# Patient Record
Sex: FEMALE | Race: WHITE | NOT HISPANIC OR LATINO | Employment: OTHER | URBAN - METROPOLITAN AREA
[De-identification: names, ages, dates, MRNs, and addresses within clinical notes are randomized per-mention and may not be internally consistent; named-entity substitution may affect disease eponyms.]

---

## 2021-10-30 PROCEDURE — 81001 URINALYSIS AUTO W/SCOPE: CPT | Performed by: STUDENT IN AN ORGANIZED HEALTH CARE EDUCATION/TRAINING PROGRAM

## 2021-10-30 PROCEDURE — 99285 EMERGENCY DEPT VISIT HI MDM: CPT | Mod: 25

## 2021-10-30 PROCEDURE — 81001 URINALYSIS AUTO W/SCOPE: CPT | Performed by: EMERGENCY MEDICINE

## 2021-10-31 ENCOUNTER — HOSPITAL ENCOUNTER (EMERGENCY)
Facility: HOSPITAL | Age: 71
Discharge: HOME | End: 2021-10-31
Attending: EMERGENCY MEDICINE | Admitting: EMERGENCY MEDICINE
Payer: MEDICARE

## 2021-10-31 ENCOUNTER — APPOINTMENT (OUTPATIENT)
Dept: CT IMAGING | Facility: HOSPITAL | Age: 71
End: 2021-10-31
Attending: EMERGENCY MEDICINE
Payer: MEDICARE

## 2021-10-31 VITALS
RESPIRATION RATE: 18 BRPM | OXYGEN SATURATION: 96 % | WEIGHT: 140 LBS | DIASTOLIC BLOOD PRESSURE: 63 MMHG | SYSTOLIC BLOOD PRESSURE: 135 MMHG | HEART RATE: 64 BPM | TEMPERATURE: 97 F

## 2021-10-31 DIAGNOSIS — N20.1 URETEROLITHIASIS: ICD-10-CM

## 2021-10-31 LAB
ALBUMIN UR-MCNC: NEGATIVE MG/DL
ANION GAP SERPL CALCULATED.3IONS-SCNC: 7 MMOL/L (ref 5–18)
APPEARANCE UR: CLEAR
BILIRUB UR QL STRIP: NEGATIVE
BUN SERPL-MCNC: 15 MG/DL (ref 8–28)
C REACTIVE PROTEIN LHE: <0.1 MG/DL (ref 0–0.8)
CALCIUM SERPL-MCNC: 10 MG/DL (ref 8.5–10.5)
CHLORIDE BLD-SCNC: 109 MMOL/L (ref 98–107)
CLUE CELLS: ABNORMAL
CO2 SERPL-SCNC: 25 MMOL/L (ref 22–31)
COLOR UR AUTO: COLORLESS
CREAT SERPL-MCNC: 0.83 MG/DL (ref 0.6–1.1)
ERYTHROCYTE [DISTWIDTH] IN BLOOD BY AUTOMATED COUNT: 13.5 % (ref 10–15)
GFR SERPL CREATININE-BSD FRML MDRD: 71 ML/MIN/1.73M2
GLUCOSE BLD-MCNC: 108 MG/DL (ref 70–125)
GLUCOSE UR STRIP-MCNC: NEGATIVE MG/DL
HCT VFR BLD AUTO: 36.8 % (ref 35–47)
HGB BLD-MCNC: 12.2 G/DL (ref 11.7–15.7)
HGB UR QL STRIP: ABNORMAL
KETONES UR STRIP-MCNC: NEGATIVE MG/DL
LEUKOCYTE ESTERASE UR QL STRIP: NEGATIVE
MCH RBC QN AUTO: 30.8 PG (ref 26.5–33)
MCHC RBC AUTO-ENTMCNC: 33.2 G/DL (ref 31.5–36.5)
MCV RBC AUTO: 93 FL (ref 78–100)
MUCOUS THREADS #/AREA URNS LPF: PRESENT /LPF
NITRATE UR QL: NEGATIVE
PH UR STRIP: 7 [PH] (ref 5–7)
PLATELET # BLD AUTO: 436 10E3/UL (ref 150–450)
POTASSIUM BLD-SCNC: 3.9 MMOL/L (ref 3.5–5)
RBC # BLD AUTO: 3.96 10E6/UL (ref 3.8–5.2)
RBC URINE: 10 /HPF
SODIUM SERPL-SCNC: 141 MMOL/L (ref 136–145)
SP GR UR STRIP: 1.01 (ref 1–1.03)
SQUAMOUS EPITHELIAL: <1 /HPF
TRICHOMONAS, WET PREP: ABNORMAL
UROBILINOGEN UR STRIP-MCNC: <2 MG/DL
WBC # BLD AUTO: 9.7 10E3/UL (ref 4–11)
WBC URINE: <1 /HPF
WBC'S/HIGH POWER FIELD, WET PREP: ABNORMAL
YEAST, WET PREP: ABNORMAL

## 2021-10-31 PROCEDURE — 250N000011 HC RX IP 250 OP 636: Performed by: EMERGENCY MEDICINE

## 2021-10-31 PROCEDURE — 74174 CTA ABD&PLVS W/CONTRAST: CPT

## 2021-10-31 PROCEDURE — 250N000013 HC RX MED GY IP 250 OP 250 PS 637: Performed by: EMERGENCY MEDICINE

## 2021-10-31 PROCEDURE — 86141 C-REACTIVE PROTEIN HS: CPT | Performed by: EMERGENCY MEDICINE

## 2021-10-31 PROCEDURE — 85027 COMPLETE CBC AUTOMATED: CPT | Performed by: EMERGENCY MEDICINE

## 2021-10-31 PROCEDURE — 36415 COLL VENOUS BLD VENIPUNCTURE: CPT | Performed by: EMERGENCY MEDICINE

## 2021-10-31 PROCEDURE — 80048 BASIC METABOLIC PNL TOTAL CA: CPT | Performed by: EMERGENCY MEDICINE

## 2021-10-31 PROCEDURE — 87210 SMEAR WET MOUNT SALINE/INK: CPT | Performed by: EMERGENCY MEDICINE

## 2021-10-31 RX ORDER — OXYCODONE HYDROCHLORIDE 5 MG/1
5 TABLET ORAL ONCE
Status: COMPLETED | OUTPATIENT
Start: 2021-10-31 | End: 2021-10-31

## 2021-10-31 RX ORDER — OXYCODONE HYDROCHLORIDE 5 MG/1
5 TABLET ORAL EVERY 4 HOURS PRN
Qty: 8 TABLET | Refills: 0 | Status: SHIPPED | OUTPATIENT
Start: 2021-10-31 | End: 2021-11-04

## 2021-10-31 RX ORDER — DIMENHYDRINATE 50 MG
50 TABLET ORAL EVERY 6 HOURS PRN
Qty: 28 TABLET | Refills: 0 | Status: SHIPPED | OUTPATIENT
Start: 2021-10-31 | End: 2021-11-07

## 2021-10-31 RX ORDER — MORPHINE SULFATE 4 MG/ML
4 INJECTION, SOLUTION INTRAMUSCULAR; INTRAVENOUS ONCE
Status: DISCONTINUED | OUTPATIENT
Start: 2021-10-31 | End: 2021-10-31 | Stop reason: HOSPADM

## 2021-10-31 RX ORDER — IOPAMIDOL 755 MG/ML
100 INJECTION, SOLUTION INTRAVASCULAR ONCE
Status: COMPLETED | OUTPATIENT
Start: 2021-10-31 | End: 2021-10-31

## 2021-10-31 RX ADMIN — OXYCODONE HYDROCHLORIDE 5 MG: 5 TABLET ORAL at 03:15

## 2021-10-31 RX ADMIN — IOPAMIDOL 100 ML: 755 INJECTION, SOLUTION INTRAVENOUS at 02:24

## 2021-10-31 NOTE — DISCHARGE INSTRUCTIONS
Your symptoms today are likely due to a kidney stone that was found in the left kidney system.  Use the provided medications for any discomfort but use ibuprofen and Tylenol primarily.  Follow-up with kidney stone specialists within the week.    Return to the ED if symptoms worsen or new arise

## 2021-10-31 NOTE — ED TRIAGE NOTES
Pt reports persistent vaginal pressure and feeling like she needs to urinate almost constantly over the past month that began shortly after she and her spouse had sex. Pt reports she has had 3 urinalyses, the most recent being 2 days ago, since the onset of symptoms. Pt reports all 3 were negative for bacteria although the initial one contained blood. Denies fevers, denies SOB, Denies CP, Denies cough, denies NVD, Denies blood in stool. No other complaints.

## 2021-10-31 NOTE — ED PROVIDER NOTES
EMERGENCY DEPARTMENT ENCOUNTER      FINAL IMPRESSION    1. Ureterolithiasis        MEDICAL DECISION MAKING    71-year-old female with minimal medical history presented to the ED with low pelvic abdominal discomfort with sensation of inability to fully void. Vitals on arrival significant hypertension 218/95. Examination with suprapubic discomfort.  Etiology concerning for an occult UTI versus stone versus occult aortic process or diverticular appendiceal process.  Given the significant hypertension some occult concern for aortic process though patient appears quite comfortable.     IV established lab work is sent is noted grossly reassuring.  UA without infectious evidence. Wet prep negative. CRP negative.     CTA abdomen pelvis shows ureterolithiasis with left stone distally and mild hydronephrosis. .     Patient given IV fluids analgesics and antiemetics with good results.     Ultimately with a grossly reassuring work-up at this time I believe she is safe for discharge.    Due to contagious pathogen concern full protective equipment was utilized through the duration of the interaction including N95  mask, eye shield, hair cover, gown and gloves.     MEDICATIONS GIVEN IN THE EMERGENCY:  Medications   morphine (PF) injection 4 mg (4 mg Intravenous Not Given 10/31/21 0117)   iopamidol (ISOVUE-370) solution 100 mL (100 mLs Intravenous Given 10/31/21 0224)     NEW PRESCRIPTIONS STARTED AT TODAY'S ER VISIT     Review of your medicines      START taking      Dose / Directions   dimenhyDRINATE 50 MG tablet  Commonly known as: DRAMAMINE      Dose: 50 mg  Take 1 tablet (50 mg) by mouth every 6 hours as needed for other (kidney stone pain management)  Quantity: 28 tablet  Refills: 0     oxyCODONE 5 MG tablet  Commonly known as: ROXICODONE      Dose: 5 mg  Take 1 tablet (5 mg) by mouth every 4 hours as needed for severe pain If pain is not improved with acetaminophen and ibuprofen.  Quantity: 8 tablet  Refills: 0            Where to get your medicines      Some of these will need a paper prescription and others can be bought over the counter. Ask your nurse if you have questions.    Bring a paper prescription for each of these medications    dimenhyDRINATE 50 MG tablet    oxyCODONE 5 MG tablet       CHIEF COMPLAINT    Chief Complaint   Patient presents with     urinary symptoms     vaginal pain       HPI    Rossana Rice is a 71 year old female with no pertinent past medical history who presents to this Emergency Department for evaluation of urinary symptoms.      Over the past 1 month, patient has had multiple episodes of suprapubic discomfort, urgency, and feels as if she is not fully emptying her bladder. The suprapubic discomfort is described as an achy pressure. She assumed that symptoms were related to a UTI, although has had 3 urine tests and all returned negative, although the first one contained blood. Has a history of previous hysterectomy but no other abdominal surgeries. Otherwise denies fever, nausea, vomiting, diarrhea, constipation, vaginal bleeding or discharge, chest pain, shortness of breath, or any other complaints at this time. No history of kidney stones.     This document serves as a record of services performed by Dr. Cristhian Mathew. It was created on his behalf by Aundrea Santos, trained medical scribe. The creation of this record is based on the scribes personal observations and the providers statements to him/her. This document has been checked and approved by the attending provider.    RELEVANT HISTORY, MEDICATIONS, & ALLERGIES   Past medical history, surgical history, family history, medications, and allergies reviewed and pertinent noted in HPI. See end of note for comprehensive list.    REVIEW OF SYSTEMS    Constitutional:  No fever or chills, no weakness  Respiratory: No shortness of breath, no cough  Cardiovascular:  No chest pain, no palpitations, no syncope.  GI:  No nausea, no vomiting, no diarrhea.  Reports suprapubic discomfort.  : No dysuria, No hematuria, No frequency. Reports urgency and sensation of inability to fully void.   Musculoskeletal:  No new muscle/joint pain, no swelling, no loss of function.   Neurologic:  No headache, no focal weakness , no sensory changes    All other systems reviewed and are negative.    PHYSICAL EXAM    VITALS SIGNS: BP (!) 179/77   Pulse 68   Temp 97  F (36.1  C) (Temporal)   Resp 18   Wt 63.5 kg (140 lb)   SpO2 98%   Constitutional:  Awake, Alert, Cooperative.  HENT: Normocephalic, Atraumatic, Bilateral external ears normal, Oropharynx moist, Nose normal.   Neck: Normal range of motion, No tenderness, Supple, No meningismus, No stridor.   Eyes: PERRL, EOMI, Conjunctiva normal, No discharge.   Respiratory: Normal breath sounds, No respiratory distress, No wheezing, No chest tenderness.   Cardiovascular: Normal heart rate, Normal rhythm, No murmurs, No rubs, No gallops.   GI: Soft, mild suprapubic tenderness, No guarding, No CVA tenderness.   : Performed with female tech chaperone present - without discomfort nor discharge.  Musculoskeletal: Neurovascularly intact distally, No edema, No tenderness  Integument: Warm, Dry, No erythema, No rash.   Neurologic: Alert & oriented x 3, Normal motor function, Normal sensory function, No focal deficits noted.     LABS  Labs Ordered and Resulted from Time of ED Arrival to Time of ED Departure   ROUTINE UA WITH MICROSCOPIC REFLEX TO CULTURE - Abnormal       Result Value    Color Urine Colorless      Appearance Urine Clear      Glucose Urine Negative      Bilirubin Urine Negative      Ketones Urine Negative      Specific Gravity Urine 1.007      Blood Urine 0.03 mg/dL (*)     pH Urine 7.0      Protein Albumin Urine Negative      Urobilinogen Urine <2.0      Nitrite Urine Negative      Leukocyte Esterase Urine Negative      Mucus Urine Present (*)     RBC Urine 10 (*)     WBC Urine <1      Squamous Epithelials Urine <1     BASIC  METABOLIC PANEL - Abnormal    Sodium 141      Potassium 3.9      Chloride 109 (*)     Carbon Dioxide (CO2) 25      Anion Gap 7      Urea Nitrogen 15      Creatinine 0.83      Calcium 10.0      Glucose 108      GFR Estimate 71     WET PREPARATION - Abnormal    Trichomonas Absent      Yeast Absent      Clue Cells Absent      WBCs/high power field 2+ (*)    CRP INFLAMMATION - Normal    CRP <0.1     CBC WITH PLATELETS         EKG    None    RADIOLOGY    Please see official radiology report.  CTA Abdomen Pelvis with Contrast   Final Result   IMPRESSION:   1.  4 mm calculus distal left ureter. Mild left hydronephrosis.            All laboratories, imaging and EKG's were personally reviewed and interpreted by myself prior to disposition decision.     PROCEDURES    NONE    Comprehensive outline of EPIC chart Hx  PAST MEDICAL HISTORY    History reviewed. No pertinent past medical history.  History reviewed. No pertinent surgical history.    CURRENT MEDICATIONS      Current Facility-Administered Medications:      morphine (PF) injection 4 mg, 4 mg, Intravenous, Once, Cristhian Mathew MD    Current Outpatient Medications:      dimenhyDRINATE (DRAMAMINE) 50 MG tablet, Take 1 tablet (50 mg) by mouth every 6 hours as needed for other (kidney stone pain management), Disp: 28 tablet, Rfl: 0     oxyCODONE (ROXICODONE) 5 MG tablet, Take 1 tablet (5 mg) by mouth every 4 hours as needed for severe pain If pain is not improved with acetaminophen and ibuprofen., Disp: 8 tablet, Rfl: 0    ALLERGIES    Allergies   Allergen Reactions     Penicillin G Anaphylaxis     Ciprofloxacin Other (See Comments)     tendonitis     Sulfa Drugs Nausea and Vomiting       FAMILY HISTORY    History reviewed. No pertinent family history.    SOCIAL HISTORY    Social History     Socioeconomic History     Marital status:      Spouse name: Not on file     Number of children: Not on file     Years of education: Not on file     Highest education level: Not  on file   Occupational History     Not on file   Tobacco Use     Smoking status: Not on file   Substance and Sexual Activity     Alcohol use: Not on file     Drug use: Not on file     Sexual activity: Not on file   Other Topics Concern     Not on file   Social History Narrative     Not on file     Social Determinants of Health     Financial Resource Strain:      Difficulty of Paying Living Expenses:    Food Insecurity:      Worried About Running Out of Food in the Last Year:      Ran Out of Food in the Last Year:    Transportation Needs:      Lack of Transportation (Medical):      Lack of Transportation (Non-Medical):    Physical Activity:      Days of Exercise per Week:      Minutes of Exercise per Session:    Stress:      Feeling of Stress :    Social Connections:      Frequency of Communication with Friends and Family:      Frequency of Social Gatherings with Friends and Family:      Attends [employer] Services:      Active Member of Clubs or Organizations:      Attends Club or Organization Meetings:      Marital Status:    Intimate Partner Violence:      Fear of Current or Ex-Partner:      Emotionally Abused:      Physically Abused:      Sexually Abused:        This document serves as a record of services performed by Dr. Cristhian Mathew. It was created on his behalf by Aundrea Santos, trained medical scribe. The creation of this record is based on the scribes personal observations and the providers statements to him/her.     I Dr. Cristhian Mathew, personally performed the services described in this documentation as scribed by the above named individual in my presence, and it is both accurate and complete.      Cristhian Mathew MD  10/31/21 0257

## 2021-11-01 ENCOUNTER — TELEPHONE (OUTPATIENT)
Dept: UROLOGY | Facility: CLINIC | Age: 71
End: 2021-11-01

## 2021-11-01 NOTE — TELEPHONE ENCOUNTER
Message left for patient to call clinic to set up an appointment for kidney stone follow-up.  Kisha Myers RN

## 2021-11-02 ENCOUNTER — VIRTUAL VISIT (OUTPATIENT)
Dept: UROLOGY | Facility: CLINIC | Age: 71
End: 2021-11-02
Payer: MEDICARE

## 2021-11-02 DIAGNOSIS — N20.1 CALCULUS OF URETER: Primary | ICD-10-CM

## 2021-11-02 DIAGNOSIS — N13.2 HYDRONEPHROSIS WITH URINARY OBSTRUCTION DUE TO URETERAL CALCULUS: ICD-10-CM

## 2021-11-02 DIAGNOSIS — N20.1 URETEROLITHIASIS: ICD-10-CM

## 2021-11-02 DIAGNOSIS — R39.15 URINARY URGENCY: ICD-10-CM

## 2021-11-02 PROCEDURE — 99203 OFFICE O/P NEW LOW 30 MIN: CPT | Mod: 95 | Performed by: PHYSICIAN ASSISTANT

## 2021-11-02 RX ORDER — LOSARTAN POTASSIUM 50 MG/1
50 TABLET ORAL
COMMUNITY
Start: 2021-10-05

## 2021-11-02 RX ORDER — MULTIPLE VITAMINS W/ MINERALS TAB 9MG-400MCG
1 TAB ORAL DAILY
COMMUNITY

## 2021-11-02 RX ORDER — AMOXICILLIN 500 MG
2 CAPSULE ORAL DAILY
COMMUNITY

## 2021-11-02 RX ORDER — NICOTINE 14MG/24HR
PATCH, TRANSDERMAL 24 HOURS TRANSDERMAL
COMMUNITY

## 2021-11-02 ASSESSMENT — PAIN SCALES - GENERAL: PAINLEVEL: MODERATE PAIN (4)

## 2021-11-02 NOTE — PROGRESS NOTES
Assessment/Plan:    Assessment & Plan   Rossana was seen today for new patient.    Diagnoses and all orders for this visit:    Calculus of ureter  -     CT Abdomen Pelvis w/o Contrast - LOW DOSE; Future  -     Patient Stated Goal: Pass my stone    Hydronephrosis with urinary obstruction due to ureteral calculus    Urinary urgency    Stone Management Plan  Stone Management 11/2/2021   Urinary Tract Infection No suspicion of infection   Renal Colic Well controlled symptoms   Renal Failure No suspicion of renal failure   Current CT date 10/31/2021   Right sided stones? No   R Stone Event No current event   Left sided stones? Yes   L Number of ureteral stones 1   L GSD of ureteral stones 7   L Location of ureteral stone Distal   L Number of kidney stones  No renal stones   L Hydronephrosis Mild   L Stone Event New event   Diagnosis date 10/31/2021   Initial location of primary symptomatic stone Distal   Initial GSD of primary symptomatic stone 7   L MET Status Initiation   L Current Plan MET   MET 2 week F/U         PLAN    72 yo F first time stone former with recent ER visit for obstructing, left distal ureteral stone, symptoms currently controlled.    Will proceed with medical expulsive therapy. Risks and benefits were detailed of medical expulsive therapy including probability of stone passage, recurrent renal colic, and requirement of emergency medical and/or surgical care and further imaging. Patient verbalized understanding. Patient agrees with plan as discussed. She is scheduled to fly to [place], leaving Thursday for the weekend. If she pursues trip, recommend she travel with medications, strainer and copy of recent CT scan. She will return in 2 weeks with low dose CT scan.    For symptom control, she was prescribed oxycodone from ER. Over the counter symptom control medications of ibuprofen, Dramamine and Tylenol were recommended.    Video call duration: 22 minutes  30 minutes spent on the date of the encounter  doing chart review, history and exam, documentation and further activities per the note    Leslie Soler PA-C  [facility]    Subjective:     HPI  Ms. Rossana Rice is a 71 year old  female who is being evaluated via a billable video visit by [facility] [place] following [facility] ER visit for urolithiasis.    She is a first time unidentified composition stone former. She has no identified modifiable stone risk factors. She has no identified non-modifiable stone risk factors.    She was seen in ER 10/30/21 for acute onset, intermittent suprapubic pain x 1 month. She noted difficulty urinating with urinary urgency and incomplete emptying sensation. The pain was achy and pressure like. She initially attributed symptoms to UTI but had negative urinalysis results. Workup was notable for CT reporting an obstructing left ureteral stone. Labs were unremarkable. She was sent home with dramamine and oxycodone.    She is doing better today but continues to have urinary urgency. She has been taking Tylenol with good pain management, She denies symptoms of fever, chills, flank pain, nausea, vomiting, and dysuria.     CT scan from 10/31/21 is personally reviewed and demonstrates a mildly obstructing 7 x 4 mm left distal ureteral stone.    Significant labs from presentation include mild hematuria, no pyuria, negative nitrite, no bacteria, normal WBC, normal C reactive protein, normal creatinine and normal potassium.    ROS   A 12 point comprehensive review of systems is negative except for HPI    Past Medical History:   Diagnosis Date     Esophageal reflux      HTN (hypertension)      Hypercholesterolemia      Osteoporosis        No past surgical history on file.    Current Outpatient Medications   Medication Sig Dispense Refill     Calcium Carb-Cholecalciferol 600-100 MG-UNIT CAPS        dimenhyDRINATE (DRAMAMINE) 50 MG tablet Take 1 tablet (50 mg) by mouth every 6  hours as needed for other (kidney stone pain management) 28 tablet 0     losartan (COZAAR) 50 MG tablet Take 50 mg by mouth       multivitamin w/minerals (MULTI-VITAMIN) tablet Take 1 tablet by mouth daily       Omega-3 Fatty Acids (FISH OIL) 1200 MG capsule Take 2 g by mouth daily        Saccharomyces boulardii (PROBIOTIC) 250 MG CAPS        vitamin B complex with vitamin C (VITAMIN  B COMPLEX) tablet Take 1 tablet by mouth daily       vitamin E (TOCOPHEROL) 200 units (90 mg) capsule Take 200 Units by mouth daily       oxyCODONE (ROXICODONE) 5 MG tablet Take 1 tablet (5 mg) by mouth every 4 hours as needed for severe pain If pain is not improved with acetaminophen and ibuprofen. 8 tablet 0       Allergies   Allergen Reactions     Penicillin G Anaphylaxis     Ciprofloxacin Other (See Comments)     tendonitis     Sulfa Drugs Nausea and Vomiting       Social History     Socioeconomic History     Marital status:      Spouse name: Not on file     Number of children: Not on file     Years of education: Not on file     Highest education level: Not on file   Occupational History     Not on file   Tobacco Use     Smoking status: Not on file   Substance and Sexual Activity     Alcohol use: Not on file     Drug use: Not on file     Sexual activity: Not on file   Other Topics Concern     Parent/sibling w/ CABG, MI or angioplasty before 65F 55M? Not Asked   Social History Narrative     Not on file     Social Determinants of Health     Financial Resource Strain:      Difficulty of Paying Living Expenses:    Food Insecurity:      Worried About Running Out of Food in the Last Year:      Ran Out of Food in the Last Year:    Transportation Needs:      Lack of Transportation (Medical):      Lack of Transportation (Non-Medical):    Physical Activity:      Days of Exercise per Week:      Minutes of Exercise per Session:    Stress:      Feeling of Stress :    Social Connections:      Frequency of Communication with Friends and  Family:      Frequency of Social Gatherings with Friends and Family:      Attends Muslim Services:      Active Member of Clubs or Organizations:      Attends Club or Organization Meetings:      Marital Status:    Intimate Partner Violence:      Fear of Current or Ex-Partner:      Emotionally Abused:      Physically Abused:      Sexually Abused:        No family history on file.    Objective:     No vitals or physical exam obtained due to virtual visit    LABS   Most Recent 3 CBC's:  Recent Labs   Lab Test 10/31/21  0116   WBC 9.7   HGB 12.2   MCV 93        Most Recent 3 BMP's:  Recent Labs   Lab Test 10/31/21  0116      POTASSIUM 3.9   CHLORIDE 109*   CO2 25   BUN 15   CR 0.83   ANIONGAP 7   MICHELLE 10.0        Most Recent 6 Bacteria Isolates From Any Culture (See EPIC Reports for Culture Details):No lab results found.  Most Recent Urinalysis:  Recent Labs   Lab Test 10/30/21  2351   COLOR Colorless   APPEARANCE Clear   URINEGLC Negative   URINEBILI Negative   URINEKETONE Negative   SG 1.007   UBLD 0.03 mg/dL*   URINEPH 7.0   PROTEIN Negative   NITRITE Negative   LEUKEST Negative   RBCU 10*   WBCU <1     Most Recent ESR & CRP:  Recent Labs   Lab Test 10/31/21  0116   CRP <0.1

## 2021-11-02 NOTE — PATIENT INSTRUCTIONS
Patient Stated Goal: Pass my stone  Symptom Control While Passing A Stone    The goal of [facility] [place] is to let a smaller kidney stone (less than 4 to 5 mm) pass without intervention if possible. Giving your body a chance to clear the stone may take a few hours up to a few weeks.  Keeping you well-informed, safe and fairly comfortable is important.    Drink to thirst  Do not attempt to  flush out  your stone by drinking too much fluid. This does not work and may increase nausea. Drink enough to satisfy your body s thirst. Eating your normal diet is fine.   Medications (that may be suggested or prescribed)    Ibuprofen (Advil or Motrin) Available over the counter  o Take two (200mg) tablets every six hours until the stone passes.  o Prevents spasm of the ureter.    o Decreases pain.      Dramamine* (drowsy version, non-generic formulation) Available over the counter  o Take 50mg at bedtime  o Decreases spasms of the ureter  o Decreases nausea  o Decreases acute pain  o Decreases recurrence of pain for 24 hours  o Will help you sleep  *This medication will cause increase drowsiness, do not drive or operate machinery for 6 hours.      Narcotics (Percocet, Vicodin, Dilaudid) Take as prescribed for severe pain unrelieved by ibuprofen and Dramamine  o Narcotics have significant side effects and only  cover-up  pain. They have no effect on the cause of pain.  o Common side effects  - Confusion, disorientation and sedation - DO NOT DRIVE OR OPERATE MACHINERY WITHIN 24 HOURS  - Nausea - take Dramamine or Zofran or Haldol to help control  - Constipation  - Sleep disturbances      Ondansetron (Zofran) Take as prescribed  o Reserve for severe nausea  o May cause constipation, start over the counter Miralax if needed      Second Line Anti-Nausea Medication: Adding a different anti-nausea medication maybe helpful for persistent nausea.  The combined effect of different types of anti-nausea medications maybe more  effective than either medication by itself, even in higher doses.  o Compazine: Take as prescribed      Information about kidney stones    Crystals can form if chemicals are too concentrated in your urine. If the crystal grows over time, a stone may form. A stone usually isn t painful while it is still in the kidney.    As the stone begins to leave the kidney, you may experience episodes of flank pain as the kidney stone approaches the entrance to the ureter. Some people feel a vague ache in the side.    Kidney stones may fall into the ureter. Some stones are tiny and pass through without causing symptoms. The ureter is a small tube (approximately 1/8 of an inch wide). A kidney stone can get stuck and block the ureter. If this happens, urine backs up and flows back to the kidney. Back pressure on the kidney can cause:  o Severe flank pain radiating to the groin.  o Severe nausea and vomiting.  o The pain can occur in the lower back, side, groin or all three.      When the stone reaches the lower ureter, this can irritate the bladder and sensations of feeling the urge to urinate frequently and urgently may occur.      Once the stone passes out of your ureter and into your bladder, the symptoms of urgency and frequency will often disappear. Sometimes pain will come back for a short period and will not be as severe as before. The passage of the stone from your bladder and out of your body is usually not a problem. The urethra is at least twice as wide as the normal ureter, so the stone doesn t usually block it.    Strain all urine  If you pass the stone, save it. Place it in the container we have provided and bring it to the [facility] [place] within a week of passing it. Your stone will then be sent for analysis which takes about a month.     Signs and symptoms you might experience    Nausea    Decreased appetite    Urinary frequency    Bloody urine     Chills    Fatigue    When to call [facility] [place] or  go to the Emergency Room    Fever with a temperature greater than 100.1    Severe pain    Persistent nausea/vomiting    If the pain worsens or nausea/vomiting is uncontrolled with medications, STOP eating & drinking. You need to have an empty stomach for 8 hours prior to surgery. Call the [facility] immediately at [phone].           Follow-up    Low dose CT scan with doctor visit 1-2 weeks after initial clinic visit per doctor s instructions    Please cancel the CT scan visit if you pass a stone. Reschedule for a one month follow-up with doctor to discuss stone composition and future prevention.    Preventing future stones    Approximately a month after your stone is sent out for analysis, a prevention visit will occur with your provider, to discuss an individualized plan for prevention of new stones and to discuss managing stones that you may still have. Along with the analysis of the kidney stone, blood and urine tests may be indicated to develop this plan. Knowing the type of kidney stones you make, and why, allows the providers at the [facility] to recommend specific ways to prevent them.    Follow-up visits are an important part of monitoring and preventing future re-occurrences.    The [facility] is available for questions or concerns 24 hours a day at [phone]

## 2021-11-02 NOTE — PROGRESS NOTES
Patient is roomed via telephone for a virtual visit.  Patient confirmed she is in the [facility] at the time of this appointment.  Patient understands that this virtual visit is billable and agree to proceed with appointment.

## 2021-11-10 ENCOUNTER — TELEPHONE (OUTPATIENT)
Dept: UROLOGY | Facility: CLINIC | Age: 71
End: 2021-11-10
Payer: MEDICARE

## 2021-11-16 ENCOUNTER — VIRTUAL VISIT (OUTPATIENT)
Dept: UROLOGY | Facility: CLINIC | Age: 71
End: 2021-11-16
Payer: MEDICARE

## 2021-11-16 ENCOUNTER — HOSPITAL ENCOUNTER (OUTPATIENT)
Dept: CT IMAGING | Facility: HOSPITAL | Age: 71
Discharge: HOME | End: 2021-11-16
Attending: PHYSICIAN ASSISTANT | Admitting: PHYSICIAN ASSISTANT
Payer: MEDICARE

## 2021-11-16 DIAGNOSIS — N20.1 CALCULUS OF URETER: ICD-10-CM

## 2021-11-16 DIAGNOSIS — N20.1 CALCULUS OF URETER: Primary | ICD-10-CM

## 2021-11-16 PROCEDURE — 74176 CT ABD & PELVIS W/O CONTRAST: CPT | Mod: MG

## 2021-11-16 PROCEDURE — 99214 OFFICE O/P EST MOD 30 MIN: CPT | Mod: 95 | Performed by: UROLOGY

## 2021-11-16 RX ORDER — ACETAMINOPHEN 500 MG
1000 TABLET ORAL
Status: CANCELLED | OUTPATIENT
Start: 2021-11-16

## 2021-11-16 RX ORDER — KETOROLAC TROMETHAMINE 30 MG/ML
15 INJECTION, SOLUTION INTRAMUSCULAR; INTRAVENOUS
Status: CANCELLED | OUTPATIENT
Start: 2021-11-16

## 2021-11-16 ASSESSMENT — PAIN SCALES - GENERAL: PAINLEVEL: NO PAIN (0)

## 2021-11-16 NOTE — PROGRESS NOTES
Patient is roomed via telephone for a virtual visit.  Patient confirmed she is in the [facility] at the time of this appointment.  Patient understands that this virtual visit is billable and agree to proceed with appointment.

## 2021-11-16 NOTE — PROGRESS NOTES
Assessment/Plan:    Assessment & Plan   Rossana was seen today for follow up.    Diagnoses and all orders for this visit:    Calculus of ureter  -     Patient Stated Goal: Pass my stone  -     Case Request: CYSTOURETEROSCOPY, WITH LASER LITHOTRIPSY, CALCULUS REMOVAL AND URETERAL STENT INSERTION; Standing  -     Case Request: CYSTOURETEROSCOPY, WITH LASER LITHOTRIPSY, CALCULUS REMOVAL AND URETERAL STENT INSERTION    Other orders  -     XR Surgery VIET Fluoro L/T 5 Min; Standing  -     XR Surgery VIET Fluoro L/T 5 Min        Stone Management Plan  Stone Management 11/2/2021 11/16/2021   Urinary Tract Infection No suspicion of infection No suspicion of infection   Renal Colic Well controlled symptoms Well controlled symptoms   Renal Failure No suspicion of renal failure No suspicion of renal failure   Current CT date 10/31/2021 11/16/2021   Right sided stones? No No   R Stone Event No current event No current event   Left sided stones? Yes Yes   L Number of ureteral stones 1 1   L GSD of ureteral stones 7 7   L Location of ureteral stone Distal Distal   L Number of kidney stones  No renal stones No renal stones   L Hydronephrosis Mild Mild   L Stone Event New event Established event   Diagnosis date 10/31/2021 -   Initial location of primary symptomatic stone Distal -   Initial GSD of primary symptomatic stone 7 -   L MET Status Initiation Failure   Failure - Adequate duration   L Current Plan MET Clear   MET 2 week F/U -   Clear rationale - MET failure             PLAN    Video call duration: 12 minutes  17 minutes spent on the date of the encounter doing chart review, history and exam, documentation and further activities per the note    SALEEM COYLE MD  [facility]    HPI  Ms. Rossana Rice is a 71 year old  female who is being evaluated via a billable video visit by [facility] [place] for medical expulsive therapy follow up.     On last  encounter, her 7 mm stone was in left distal ureter with Mild hydronephrosis. She has had no unanticipated events.    Symptoms have been well controlled with medication and she is able to carry on normal activities. She is asymptomatic at present. Pertinent negative current symptoms include:  fever and chills. She has had minimal symptoms since early October.    New CT scan was personally reviewed and demonstrates no progression of stone with stable Mild hydronephrosis.     She has failed adequate duration of medical expulsive therapy and will proceed to the operating room for ureteroscopic stone clearance. Risks and benefits were detailed of ureteroscopic stone clearance including potential issues of urinary or systemic infection, ureteral injury, inaccessible stone, incomplete stone clearance, multiple surgeries, and stent related symptoms of urgency, frequency and hematuria.    ROS   Review of systems is negative except for HPI.    Past Medical History:   Diagnosis Date     Esophageal reflux      HTN (hypertension)      Hypercholesterolemia      Osteoporosis        No past surgical history on file.    Current Outpatient Medications   Medication Sig Dispense Refill     Calcium Carb-Cholecalciferol 600-100 MG-UNIT CAPS        losartan (COZAAR) 50 MG tablet Take 50 mg by mouth       multivitamin w/minerals (MULTI-VITAMIN) tablet Take 1 tablet by mouth daily       Omega-3 Fatty Acids (FISH OIL) 1200 MG capsule Take 2 g by mouth daily        Saccharomyces boulardii (PROBIOTIC) 250 MG CAPS        vitamin B complex with vitamin C (VITAMIN  B COMPLEX) tablet Take 1 tablet by mouth daily       vitamin E (TOCOPHEROL) 200 units (90 mg) capsule Take 200 Units by mouth daily         Allergies   Allergen Reactions     Penicillin G Anaphylaxis     Ciprofloxacin Other (See Comments)     tendonitis     Sulfa Drugs Nausea and Vomiting       Social History     Socioeconomic History     Marital status:      Spouse name: Not  on file     Number of children: Not on file     Years of education: Not on file     Highest education level: Not on file   Occupational History     Not on file   Tobacco Use     Smoking status: Not on file     Smokeless tobacco: Not on file   Substance and Sexual Activity     Alcohol use: Not on file     Drug use: Not on file     Sexual activity: Not on file   Other Topics Concern     Parent/sibling w/ CABG, MI or angioplasty before 65F 55M? Not Asked   Social History Narrative     Not on file     Social Determinants of Health     Financial Resource Strain: Not on file   Food Insecurity: Not on file   Transportation Needs: Not on file   Physical Activity: Not on file   Stress: Not on file   Social Connections: Not on file   Intimate Partner Violence: Not on file   Housing Stability: Not on file       No family history on file.    Objective:     Appears AAO x 3  No vitals obtained due to virtual visit    Labs   Most Recent 3 CBC's:Recent Labs   Lab Test 10/31/21  0116   WBC 9.7   HGB 12.2   MCV 93        Most Recent 3 BMP's:Recent Labs   Lab Test 10/31/21  0116      POTASSIUM 3.9   CHLORIDE 109*   CO2 25   BUN 15   CR 0.83   ANIONGAP 7   MICHELLE 10.0        Most Recent Urinalysis:Recent Labs   Lab Test 10/30/21  2351   COLOR Colorless   APPEARANCE Clear   URINEGLC Negative   URINEBILI Negative   URINEKETONE Negative   SG 1.007   UBLD 0.03 mg/dL*   URINEPH 7.0   PROTEIN Negative   NITRITE Negative   LEUKEST Negative   RBCU 10*   WBCU <1     Acute Labs Urine Culture  No results found for: CULTURE

## 2021-11-16 NOTE — PATIENT INSTRUCTIONS
Patient Stated Goal: Pass my stone  Ureteroscopy    Ureteroscopy is a procedure which is done for clearance of stones from the ureter, kidney or both. There are no incisions involved. The procedure involves your surgeon placing a small scope into your urethra. This is the opening where urine leaves your body.  The surgeon watches as they carefully guide the scope to the stone(s).  Modern flexible ureteroscopes can be used to reach virtually any location within the urinary tract.     The size, shape and location of the stone determines how best to treat the stone(s).  Whenever possible, stones are removed in one piece.  Larger stones need to be broken using a laser before removing in smaller pieces.  The goal is to remove all stones and stone fragments from that side of the body in a single treatment.  Complete stone clearance is an important step to prevent future kidney stone episodes.    Surgery:    Same day outpatient procedure    30-60 minutes    Procedure done in hospital surgical suite    General anesthesia (you will be asleep during the procedure)     Antibiotic prior to surgery to prevent infection    Physician will visit with you and respond to any questions or concerns and consent will be signed prior to going to the operating room    Risks:    Infection - Preoperative antibiotics should prevent new infections but it is possible that unanticipated bacteria may be introduced at time of surgery or that the stones were actually chronically infected before surgery      Injury - The ureter may be injured during this procedure.  This is most likely to happen if the ureter was very inflamed before surgery or if a stone is very tightly impacted.  The surgeon will not aggressively treat a stone if this creates a risk of injury.        Inaccessible Stones -A single procedure is effective in 95% of cases, but if your ureter is very narrow or your kidney stone is very impacted, a stent will be placed and the  procedure stopped.  In 1-2 weeks after the ureter has relaxed, the patient will be brought back to surgery and the procedure can be safely performed.      Incomplete stone clearance -Occasionally stone or stone fragments may not be completely cleared.  These may pass on their own, which may cause discomfort.  Our goal is to remove all possible stones and fragments.    Stent:      An internal soft tube will be placed between the kidney and the bladder while in surgery (after the stone is cleared). The stent will keep the kidney draining.    What should I expect?     It is common for a stent to cause some irritation and discomfort.   You may have:      The need to urinate suddenly     The need to urinate often     Pain during urination     A dull backache, which may get worse during urination     Blood stained urine (like fruit punch) and occasional small clots    It s important to remember the stent is necessary and only temporary. To feel more comfortable:      Drink more than you normally would but you do not have to constantly  flush your kidneys     Limiting your activity may decrease irritation or bleeding    Ibuprofen - 2 tablets every 6-8 hours     Use pain medications as directed.    When is the stent removed?    Most stents are removed within 5 days to 2 weeks after a procedure.     How is the stent removed?     Your stent will be removed in the [facility] with a small telescope and a grasping tool.  It usually takes less than 1 minute to remove the stent.    What should I expect after the stent is removed?     You should feel normal by the next day    Some patients find:    An increase in back pain about an hour after the stent is removed as the kidney fills up with urine before it starts to empty.  It can be as uncomfortable as your initial stone episode.  Taking pain medications before stent removal may be helpful, but you would need someone else to drive you to and from your  appointment.    Bladder symptoms usually disappear by the next morning.    Small amounts of blood in the urine may be seen occasionally for up to a week.    Diet:      After surgery, there are no dietary restrictions - Drink to thirst, there is no need to increase intake of fluids, as this may increase nausea symptoms. Try to eat smaller, more frequent snacks, instead of large meals.    Activity:    Many people return to work within 1-2 days. Fatigue is normal for a couple of weeks following surgery. With increased activity you may experience more discomfort and you may notice more blood in your urine.      Post-Operative Symptom Control    While you recover from your procedure, you can take steps to ease your recovery.    Medications that prevent further episodes of severe pain and help stones pass: Take these as prescribed on a regular basis even if you are NOT in pain      Ibuprofen (Advil or Motrin) - Is available over the counter Take 2 (200mg) tablets every 6 hours until the stone passes.  o prevents spasm of the ureter.    o Decreases pain      Dramamine - (drowsy version, non-generic formulation) Is available over the counter and decreases spasm of the ureter.  Take 50mg at bedtime every night until the stone passes. In addition, take every 6 hours as needed.  Dramamine:  o Decreases nausea  o Decreases acute pain  o Decreases recurrence of pain for next 24 hours  o Will help you sleep        *This medication will cause increased drowsiness, do not drive or operate machinery for 6 hours      Flomax- Studies show that Flomax decreases irritation from stents.   o Take every day with food until stone passes even if you do not have pain  o Flomax does not relieve pain.        *This medication may cause nasal congestion or light-headedness      Detrol ( Tolterodine) - After surgery Detrol may decrease stent irritation and pelvic pain  o Take as prescribed     *This medication may cause dry mouth, constipation or  blurry vision. Stop medication if unable to urinate.    Medication that are taken as needed to manage break through symptoms: Take these ONLY as required and hopefully not at all      Narcotics (Percocet, Vicodin, Dilaudid)- take as prescribed for severe pain unrelieved by ibuprofen and dramamine  o Take as prescribed for severe pain  o Narcotics have significant side effects and only  cover-up  pain. They have no effect on cause of pain.  o Common side effects:  - Confusion, disorientation and sedation - DO NOT DRIVE OR OPERATE MACHINERY WITHIN 24 HOURS  - Nausea - take Dramamine or Zofran  or Haldol to help control  - Constipation  - Sleep disturbances      Ondansetron (Zofran)-  o Take as prescribed  o Reserve for severe nausea  o May cause constipation, start over the counter Miralax if needed to treat this    Haldol-  o Take as prescribed  o Reserve for severe nausea    Warning Signs/Symptoms - Please call the [facility] [place] 24 hours a day at [phone] IMMEDIATELY if you experience any of these:    Fever greater than 100.1     Chills    Pain NOT CONTROLLED by pain medications    Heavy bleeding or large clots in urine (small clots can be normal)    Persistent nausea and/or vomiting    Post-Operative Follow up:    The stone(s) will be sent from surgery to a lab for composition analysis.  These results are usually available before a one month post-operative visit.  If you had laser treatment to break up your stone, you will usually be scheduled for a low dose CT scan prior to your one month appointment.  This scan allows your surgeon to confirm that all stone fragments were cleared at time of surgery and that there have been no complications.  These results along with possible labs and urine studies will help us develop an individualized plan to prevent new stones from forming and keep existing stones from enlarging.  This visit is usually scheduled about 1 month after your original surgery.    The  [facility] [place] can respond to your questions or concerns 24 hours a day at [phone].

## 2021-11-17 DIAGNOSIS — N20.1 CALCULUS OF URETER: Primary | ICD-10-CM

## 2021-11-29 ENCOUNTER — ANESTHESIA EVENT (OUTPATIENT)
Dept: SURGERY | Facility: AMBULATORY SURGERY CENTER | Age: 71
End: 2021-11-29
Payer: MEDICARE

## 2021-11-29 ASSESSMENT — MIFFLIN-ST. JEOR: SCORE: 1184.93

## 2021-11-30 ENCOUNTER — ANESTHESIA (OUTPATIENT)
Dept: SURGERY | Facility: AMBULATORY SURGERY CENTER | Age: 71
End: 2021-11-30
Payer: MEDICARE

## 2021-11-30 ENCOUNTER — HOSPITAL ENCOUNTER (OUTPATIENT)
Facility: AMBULATORY SURGERY CENTER | Age: 71
End: 2021-11-30
Attending: UROLOGY
Payer: MEDICARE

## 2021-11-30 VITALS
RESPIRATION RATE: 16 BRPM | HEART RATE: 71 BPM | OXYGEN SATURATION: 95 % | WEIGHT: 137 LBS | BODY MASS INDEX: 20.76 KG/M2 | SYSTOLIC BLOOD PRESSURE: 149 MMHG | HEIGHT: 68 IN | DIASTOLIC BLOOD PRESSURE: 74 MMHG | TEMPERATURE: 97.4 F

## 2021-11-30 DIAGNOSIS — N20.1 CALCULUS OF URETER: ICD-10-CM

## 2021-11-30 PROCEDURE — 52332 CYSTOSCOPY AND TREATMENT: CPT | Mod: LT | Performed by: UROLOGY

## 2021-11-30 DEVICE — URETERAL STENT
Type: IMPLANTABLE DEVICE | Site: URETER | Status: FUNCTIONAL
Brand: PERCUFLEX™ PLUS

## 2021-11-30 RX ORDER — MEPERIDINE HYDROCHLORIDE 25 MG/ML
12.5 INJECTION INTRAMUSCULAR; INTRAVENOUS; SUBCUTANEOUS
Status: DISCONTINUED | OUTPATIENT
Start: 2021-11-30 | End: 2021-12-01 | Stop reason: HOSPADM

## 2021-11-30 RX ORDER — SODIUM CHLORIDE, SODIUM LACTATE, POTASSIUM CHLORIDE, CALCIUM CHLORIDE 600; 310; 30; 20 MG/100ML; MG/100ML; MG/100ML; MG/100ML
INJECTION, SOLUTION INTRAVENOUS CONTINUOUS
Status: DISCONTINUED | OUTPATIENT
Start: 2021-11-30 | End: 2021-12-01 | Stop reason: HOSPADM

## 2021-11-30 RX ORDER — ONDANSETRON 2 MG/ML
4 INJECTION INTRAMUSCULAR; INTRAVENOUS EVERY 30 MIN PRN
Status: DISCONTINUED | OUTPATIENT
Start: 2021-11-30 | End: 2021-12-01 | Stop reason: HOSPADM

## 2021-11-30 RX ORDER — PROPOFOL 10 MG/ML
INJECTION, EMULSION INTRAVENOUS CONTINUOUS PRN
Status: DISCONTINUED | OUTPATIENT
Start: 2021-11-30 | End: 2021-11-30

## 2021-11-30 RX ORDER — HYDROMORPHONE HCL IN WATER/PF 6 MG/30 ML
0.2 PATIENT CONTROLLED ANALGESIA SYRINGE INTRAVENOUS EVERY 5 MIN PRN
Status: DISCONTINUED | OUTPATIENT
Start: 2021-11-30 | End: 2021-12-01 | Stop reason: HOSPADM

## 2021-11-30 RX ORDER — GENTAMICIN SULFATE 80 MG/100ML
80 INJECTION, SOLUTION INTRAVENOUS
Status: COMPLETED | OUTPATIENT
Start: 2021-11-30 | End: 2021-11-30

## 2021-11-30 RX ORDER — ACETAMINOPHEN 500 MG
1000 TABLET ORAL
Status: COMPLETED | OUTPATIENT
Start: 2021-11-30 | End: 2021-11-30

## 2021-11-30 RX ORDER — PROPOFOL 10 MG/ML
INJECTION, EMULSION INTRAVENOUS PRN
Status: DISCONTINUED | OUTPATIENT
Start: 2021-11-30 | End: 2021-11-30

## 2021-11-30 RX ORDER — LIDOCAINE 40 MG/G
CREAM TOPICAL
Status: DISCONTINUED | OUTPATIENT
Start: 2021-11-30 | End: 2021-12-01 | Stop reason: HOSPADM

## 2021-11-30 RX ORDER — FENTANYL CITRATE 50 UG/ML
INJECTION, SOLUTION INTRAMUSCULAR; INTRAVENOUS PRN
Status: DISCONTINUED | OUTPATIENT
Start: 2021-11-30 | End: 2021-11-30

## 2021-11-30 RX ORDER — FENTANYL CITRATE 50 UG/ML
25 INJECTION, SOLUTION INTRAMUSCULAR; INTRAVENOUS EVERY 5 MIN PRN
Status: DISCONTINUED | OUTPATIENT
Start: 2021-11-30 | End: 2021-12-01 | Stop reason: HOSPADM

## 2021-11-30 RX ORDER — LIDOCAINE HYDROCHLORIDE 20 MG/ML
INJECTION, SOLUTION INFILTRATION; PERINEURAL PRN
Status: DISCONTINUED | OUTPATIENT
Start: 2021-11-30 | End: 2021-11-30

## 2021-11-30 RX ORDER — OXYCODONE HYDROCHLORIDE 5 MG/1
5 TABLET ORAL EVERY 4 HOURS PRN
Status: DISCONTINUED | OUTPATIENT
Start: 2021-11-30 | End: 2021-12-01 | Stop reason: HOSPADM

## 2021-11-30 RX ORDER — ONDANSETRON 2 MG/ML
INJECTION INTRAMUSCULAR; INTRAVENOUS PRN
Status: DISCONTINUED | OUTPATIENT
Start: 2021-11-30 | End: 2021-11-30

## 2021-11-30 RX ORDER — FENTANYL CITRATE 50 UG/ML
25 INJECTION, SOLUTION INTRAMUSCULAR; INTRAVENOUS
Status: DISCONTINUED | OUTPATIENT
Start: 2021-11-30 | End: 2021-12-01 | Stop reason: HOSPADM

## 2021-11-30 RX ORDER — KETOROLAC TROMETHAMINE 15 MG/ML
15 INJECTION, SOLUTION INTRAMUSCULAR; INTRAVENOUS
Status: DISCONTINUED | OUTPATIENT
Start: 2021-11-30 | End: 2021-12-01 | Stop reason: HOSPADM

## 2021-11-30 RX ORDER — DEXAMETHASONE SODIUM PHOSPHATE 4 MG/ML
INJECTION, SOLUTION INTRA-ARTICULAR; INTRALESIONAL; INTRAMUSCULAR; INTRAVENOUS; SOFT TISSUE PRN
Status: DISCONTINUED | OUTPATIENT
Start: 2021-11-30 | End: 2021-11-30

## 2021-11-30 RX ORDER — GLYCOPYRROLATE 0.2 MG/ML
INJECTION, SOLUTION INTRAMUSCULAR; INTRAVENOUS PRN
Status: DISCONTINUED | OUTPATIENT
Start: 2021-11-30 | End: 2021-11-30

## 2021-11-30 RX ORDER — ONDANSETRON 4 MG/1
4 TABLET, ORALLY DISINTEGRATING ORAL EVERY 30 MIN PRN
Status: DISCONTINUED | OUTPATIENT
Start: 2021-11-30 | End: 2021-12-01 | Stop reason: HOSPADM

## 2021-11-30 RX ADMIN — ONDANSETRON 4 MG: 2 INJECTION INTRAMUSCULAR; INTRAVENOUS at 12:27

## 2021-11-30 RX ADMIN — KETOROLAC TROMETHAMINE 15 MG: 15 INJECTION, SOLUTION INTRAMUSCULAR; INTRAVENOUS at 11:12

## 2021-11-30 RX ADMIN — Medication 100 MCG: at 12:08

## 2021-11-30 RX ADMIN — LIDOCAINE HYDROCHLORIDE 60 MG: 20 INJECTION, SOLUTION INFILTRATION; PERINEURAL at 12:08

## 2021-11-30 RX ADMIN — GLYCOPYRROLATE 0.2 MG: 0.2 INJECTION, SOLUTION INTRAMUSCULAR; INTRAVENOUS at 12:19

## 2021-11-30 RX ADMIN — DEXAMETHASONE SODIUM PHOSPHATE 10 MG: 4 INJECTION, SOLUTION INTRA-ARTICULAR; INTRALESIONAL; INTRAMUSCULAR; INTRAVENOUS; SOFT TISSUE at 12:08

## 2021-11-30 RX ADMIN — SODIUM CHLORIDE, SODIUM LACTATE, POTASSIUM CHLORIDE, CALCIUM CHLORIDE: 600; 310; 30; 20 INJECTION, SOLUTION INTRAVENOUS at 11:12

## 2021-11-30 RX ADMIN — PROPOFOL 200 MG: 10 INJECTION, EMULSION INTRAVENOUS at 12:08

## 2021-11-30 RX ADMIN — FENTANYL CITRATE 25 MCG: 50 INJECTION, SOLUTION INTRAMUSCULAR; INTRAVENOUS at 12:59

## 2021-11-30 RX ADMIN — FENTANYL CITRATE 100 MCG: 50 INJECTION, SOLUTION INTRAMUSCULAR; INTRAVENOUS at 12:08

## 2021-11-30 RX ADMIN — Medication 1000 MG: at 11:03

## 2021-11-30 RX ADMIN — GENTAMICIN SULFATE 80 MG: 80 INJECTION, SOLUTION INTRAVENOUS at 11:15

## 2021-11-30 RX ADMIN — PROPOFOL 180 MCG/KG/MIN: 10 INJECTION, EMULSION INTRAVENOUS at 12:08

## 2021-11-30 NOTE — ANESTHESIA POSTPROCEDURE EVALUATION
Patient: Rossana Rice    Procedure: Procedure(s):  CYSTOURETEROSCOPY,  AND URETERAL STENT INSERTION       Diagnosis:Calculus of ureter [N20.1]  Diagnosis Additional Information: No value filed.    Anesthesia Type:  General    Note:  Disposition: Outpatient   Postop Pain Control: Uneventful            Sign Out: Well controlled pain   PONV: No   Neuro/Psych: Uneventful            Sign Out: Acceptable/Baseline neuro status   Airway/Respiratory: Uneventful            Sign Out: Acceptable/Baseline resp. status   CV/Hemodynamics: Uneventful            Sign Out: Acceptable CV status; No obvious hypovolemia; No obvious fluid overload   Other NRE: NONE   DID A NON-ROUTINE EVENT OCCUR? No           Last vitals:  Vitals Value Taken Time   /60 11/30/21 1315   Temp 97.1  F (36.2  C) 11/30/21 1241   Pulse 70 11/30/21 1317   Resp 16 11/30/21 1315   SpO2 95 % 11/30/21 1317   Vitals shown include unvalidated device data.    Electronically Signed By: Vinnie Valiente MD  November 30, 2021  2:16 PM

## 2021-11-30 NOTE — ANESTHESIA PROCEDURE NOTES
Airway       Patient location during procedure: OR  Staff -        Anesthesiologist:  Vinnie Valiente MD       CRNA: Shyla Santos APRN CRNA       Performed By: CRNA  Consent for Airway        Urgency: elective  Indications and Patient Condition       Indications for airway management: bishop-procedural       Induction type:intravenous       Mask difficulty assessment: 0 - not attempted    Final Airway Details       Final airway type: supraglottic airway    Supraglottic Airway Details        Type: LMA       LMA size: 4    Post intubation assessment        Placement verified by: capnometry, equal breath sounds and chest rise        Number of attempts at approach: 1       Number of other approaches attempted: 0       Secured with: silk tape       Ease of procedure: easy       Dentition: Intact and Unchanged

## 2021-11-30 NOTE — DISCHARGE INSTRUCTIONS
You received Tylenol (acetaminophen) 975 mg at 11:03 AM.  No further acetaminophen products until 5:03 PM or later, and do not exceed 4000 mg of acetaminophen in 24 hours.  Keep in mind that acetaminophen can be found in many OTC cold products and prescription pain medicines.    You received a medication called Toradol at 11:12 AM.  This is an NSAID like ibuprofen, so no further NSAIDs (ibuprofen, advil, aleve, naproxen, etc) until 5:12 PM.      Going Home With a Ureteral Stent    What is it?  A stent is a soft, plastic tube that helps urine (pee) drain into the bladder.  During the surgery, it is placed in the ureter the tube that connects the kidney to the bladder.  A thin curl at each end of the stent keeps one end in your kidney and the other in your bladder.  The stent can not be seen from outside of the body.    Why Do I Have It?  Some sort of blockage is not letting pee drain into your bladder.  This could be from a stone, certain surgeries or kidney infection.    What Should I Expect?   Stents often cause some discomfort.  You may have:    The need to pee suddenly    Pain when you pee    A dull backache, which may get worse when you pee  Blood in your pee (color of fruit punch) and some clots, which may increase with physical activity.     What Can I Do To Feel Better?    Drink a little more fluids than usual.  You can eat your normal diet.    Enjoy a warm bath.  Decrease your activity.  Some people find bending or twisting movement cause discomfort or increased blood in the urine.  Even so, you will not harm yourself.                                                        [facility] [place]                                                        Stent Removal With String    -Take Tylenol or Ibuprofen and drink fluids 1 hour prior to removing your stent at home.    -Remove the stent in the morning on the specific day as directed by your doctor.  Gently pull on the string in the shower while urinating  "until the stent is completely removed.    -Call [facility] if you have questions or concerns [phone]    -What To Expect After Your Stent Is Removed    -After stent removal, urine may be bloody and possibly have some bloody clots.    -You may experience an \"achy\" pain due to urethral spasms.  This generally only lasts a few hours, but should resolve over the next 2-3 days  "

## 2021-11-30 NOTE — ANESTHESIA PREPROCEDURE EVALUATION
Anesthesia Pre-Procedure Evaluation    Patient: Rossana Rice   MRN: [id number] : 1950        Preoperative Diagnosis: Calculus of ureter [N20.1]    Procedure : Procedure(s):  CYSTOURETEROSCOPY, WITH LASER LITHOTRIPSY, CALCULUS REMOVAL AND URETERAL STENT INSERTION          Past Medical History:   Diagnosis Date     Arthritis     Hands     Esophageal reflux      HTN (hypertension)      Hypercholesterolemia      Osteoporosis       Past Surgical History:   Procedure Laterality Date     CHEST WALL BIOPSY      Under Right Breast     COLONOSCOPY       HYSTERECTOMY        Allergies   Allergen Reactions     Penicillin G Anaphylaxis     Ciprofloxacin Other (See Comments)     tendonitis     Sulfa Drugs Nausea and Vomiting      Social History     Tobacco Use     Smoking status: Never Smoker     Smokeless tobacco: Never Used   Substance Use Topics     Alcohol use: Not Currently      Wt Readings from Last 1 Encounters:   21 62.1 kg (137 lb)        Anesthesia Evaluation   Pt has had prior anesthetic. Type: General.    No history of anesthetic complications       ROS/MED HX  ENT/Pulmonary:  - neg pulmonary ROS     Neurologic:  - neg neurologic ROS     Cardiovascular:     (+) Dyslipidemia hypertension-----    METS/Exercise Tolerance:     Hematologic:       Musculoskeletal:   (+) arthritis,     GI/Hepatic:  - neg GI/hepatic ROS   (+) GERD, Asymptomatic on medication,     Renal/Genitourinary:  - neg Renal ROS   (+) Nephrolithiasis ,     Endo:  - neg endo ROS     Psychiatric/Substance Use:  - neg psychiatric ROS     Infectious Disease:  - neg infectious disease ROS     Malignancy:  - neg malignancy ROS     Other:  - neg other ROS          Physical Exam    Airway        Mallampati: II   TM distance: > 3 FB   Neck ROM: full     Respiratory Devices and Support         Dental  no notable dental history     (+) caps      Cardiovascular   cardiovascular exam normal       Rhythm and rate: regular and normal     Pulmonary    pulmonary exam normal        breath sounds clear to auscultation           OUTSIDE LABS:  CBC:   Lab Results   Component Value Date    WBC 9.7 10/31/2021    HGB 12.2 10/31/2021    HCT 36.8 10/31/2021     10/31/2021     BMP:   Lab Results   Component Value Date     10/31/2021    POTASSIUM 3.9 10/31/2021    CHLORIDE 109 (H) 10/31/2021    CO2 25 10/31/2021    BUN 15 10/31/2021    CR 0.83 10/31/2021     10/31/2021     COAGS: No results found for: PTT, INR, FIBR  POC: No results found for: BGM, HCG, HCGS  HEPATIC: No results found for: ALBUMIN, PROTTOTAL, ALT, AST, GGT, ALKPHOS, BILITOTAL, BILIDIRECT, TIFFANY  OTHER:   Lab Results   Component Value Date    MICHELLE 10.0 10/31/2021    CRP <0.1 10/31/2021       Anesthesia Plan    ASA Status:  2   NPO Status:  NPO Appropriate    Anesthesia Type: General.     - Airway: LMA   Induction: Propofol.   Maintenance: TIVA.        Consents    Anesthesia Plan(s) and associated risks, benefits, and realistic alternatives discussed. Questions answered and patient/representative(s) expressed understanding.    - Discussed:     - Discussed with:  Patient         Postoperative Care    Pain management: Multi-modal analgesia.   PONV prophylaxis: Ondansetron (or other 5HT-3), Dexamethasone or Solumedrol     Comments:    Other Comments: Reviewed anesthetic options and risks, including risk of dental trauma. Patient agrees to proceed.     Toradol given in preop per surgeon order.    Recent Results (from the past 120 hour(s))  -COVID-19 VIRUS (CORONAVIRUS) BY PCR (EXTERNAL RESULT):   Collection Time: 11/26/21 10:23 AM       Result                                            Value                         Ref Range                       COVID-19 Virus by PCR (External Result)           Negative                      Negative                                 Vinnie Valiente MD

## 2021-11-30 NOTE — OP NOTE
[facility] [place] Operative Note    Rossana Rice   November 30, 2021 11/30/2021   Taylor Mcbride     Procedure Performed  Procedure(s):  CYSTOURETEROSCOPY,  AND URETERAL STENT INSERTION  1. Cystoscopy  2. Retrograde Pyelography - Left  3. Diagnostic Ureteroscopy - Left  4. Ureteral Stent Insertion - Left      Pre-operative Diagnosis  Calculus of ureter [N20.1]    Post-operative Diagnosis  1. Stone Left Ureter Distal - spontaneous passage      Surgeon(s) and Role:     * Lc Rios MD - Primary    Anesthesia Type  Not documented     Procedural Summary    Estimation of stone clearance: stone not identified  Subjective stone composition: stone not identified  Renal papillae assessment: plaques mild  Unanticipated event/findings: stone passage  Post-operative plan: remove own stent in 6 days with extraction string return to clinic in 1 month without CT scan    Narrative    Suspected spontaneous passage of left distal stone.    Procedural Details    Patient is brought to the surgical suite where anesthesia is induced. she is prepped and draped in standard fashion in lithotomy position.    Cystoscopy: Rigid cystoscopy is performed. Introitus is normal. Bladder has edema at left ureteric orifice.     Retrograde Pyelography:  imaging fails to demonstrate radio-opaque distal ureteric stone consistent with pre-operative imaging. Stone is not obvioous but there is tortuous proximal ureter and hydronephrosis.     Ureteral Access: Left ureteral access is initiated with Sensor wire. Guide wire access to the kidney is assured.      Rigid Ureteroscopy: Rigid ureteroscope is inserted in left ureter. There is edema in the distal intramural ureter. Stone not visualized. I run the ureteroscope above the iliacs without obviouus stone.       Flexible Ureteroscopy: Flexible ureteroscope is passed to left proximal ureter. No evidence of stone. I pursue into the hydronephrotic kidney without  obvious evidence of stone.     Ureteral Stent Insertion: Left 7F 26 cm stent is inserted with good coil in kidney and bladder under fluoroscopic guidance. Stent extraction string left dangling from urethra.      The patient was then taken to the recovery room in good condition.     Past Medical History:   Diagnosis Date     Arthritis     Hands     Esophageal reflux      HTN (hypertension)      Hypercholesterolemia      Osteoporosis         Patient Active Problem List   Diagnosis     Calculus of ureter        Estimated Blood Loss  .* No values recorded between 11/30/2021 12:17 PM and 11/30/2021 12:38 PM *     * No specimens in log *

## 2021-11-30 NOTE — ANESTHESIA CARE TRANSFER NOTE
Patient: Rossana Rice    Procedure: Procedure(s):  CYSTOURETEROSCOPY,  AND URETERAL STENT INSERTION       Diagnosis: Calculus of ureter [N20.1]  Diagnosis Additional Information: No value filed.    Anesthesia Type:   General     Note:    Oropharynx: oropharynx clear of all foreign objects and spontaneously breathing  Level of Consciousness: drowsy  Oxygen Supplementation: face mask  Level of Supplemental Oxygen (L/min / FiO2): 8  Independent Airway: airway patency satisfactory and stable  Dentition: dentition unchanged  Vital Signs Stable: post-procedure vital signs reviewed and stable  Report to RN Given: handoff report given  Patient transferred to: PACU    Handoff Report: Identifed the Patient, Identified the Reponsible Provider, Reviewed the pertinent medical history, Discussed the surgical course, Reviewed Intra-OP anesthesia mangement and issues during anesthesia, Set expectations for post-procedure period and Allowed opportunity for questions and acknowledgement of understanding      Vitals:  Vitals Value Taken Time   /60 11/30/21 1241   Temp 97.1  F (36.2  C) 11/30/21 1241   Pulse 78 11/30/21 1241   Resp 16 11/30/21 1241   SpO2 98 % 11/30/21 1241       Electronically Signed By: CYDNEY Vogel CRNA  November 30, 2021  12:42 PM

## 2021-12-06 ENCOUNTER — TELEPHONE (OUTPATIENT)
Dept: UROLOGY | Facility: CLINIC | Age: 71
End: 2021-12-06
Payer: MEDICARE

## 2021-12-06 NOTE — TELEPHONE ENCOUNTER
Patient was able to remove her stent intact without problem today.  She is feeling well and will as needed.  Kisha Myers RN

## 2021-12-22 ENCOUNTER — TELEPHONE (OUTPATIENT)
Dept: UROLOGY | Facility: CLINIC | Age: 71
End: 2021-12-22
Payer: MEDICARE

## 2022-01-03 ENCOUNTER — VIRTUAL VISIT (OUTPATIENT)
Dept: UROLOGY | Facility: CLINIC | Age: 72
End: 2022-01-03
Payer: MEDICARE

## 2022-01-03 DIAGNOSIS — N20.1 CALCULUS OF URETER: Primary | ICD-10-CM

## 2022-01-03 PROCEDURE — 99213 OFFICE O/P EST LOW 20 MIN: CPT | Mod: 95 | Performed by: UROLOGY

## 2022-01-03 ASSESSMENT — PAIN SCALES - GENERAL: PAINLEVEL: NO PAIN (0)

## 2022-01-03 NOTE — PROGRESS NOTES
Patient states that they are in [place] at the time of their visit.  Patient is aware telehealth visit is billable and agrees to proceed.  Kisha Myers RN

## 2022-01-03 NOTE — PROGRESS NOTES
Assessment/Plan:    Assessment & Plan   Rossana was seen today for follow up.    Diagnoses and all orders for this visit:    Calculus of ureter  -     Patient Stated Goal: Prevent further stones        Stone Management Plan  Stone Management 11/2/2021 11/16/2021 1/3/2022   Urinary Tract Infection No suspicion of infection No suspicion of infection No suspicion of infection   Renal Colic Well controlled symptoms Well controlled symptoms Asymptomatic at this time   Renal Failure No suspicion of renal failure No suspicion of renal failure No suspicion of renal failure   Current CT date 10/31/2021 11/16/2021 -   Right sided stones? No No -   R Stone Event No current event No current event No current event   Left sided stones? Yes Yes -   L Number of ureteral stones 1 1 -   L GSD of ureteral stones 7 7 -   L Location of ureteral stone Distal Distal -   L Number of kidney stones  No renal stones No renal stones -   L Hydronephrosis Mild Mild -   L Stone Event New event Established event Resolved event   Diagnosis date 10/31/2021 - -   Initial location of primary symptomatic stone Distal - -   Initial GSD of primary symptomatic stone 7 - -   Resolved date - - 1/3/2022   Post-op status - - No imaging   L MET Status Initiation Failure -   Failure - Adequate duration -   L Current Plan MET Clear -   MET 2 week F/U - -   Clear rationale - MET failure -             PLAN    Video call duration: 10 minutes  15 minutes spent on the date of the encounter doing chart review, history and exam, documentation and further activities per the note    SALEEM COYLE MD  [facility]    HPI  Ms. Rossana Rice is a 71 year old  female who is being evaluated via a billable video visit by [facility] for late postoperative follow-up.     She returns status post Left ureteroscopy which demonstrated passed distal stone. She has had no unanticipated post-operative  events.     She is asymptomatic at present. She denies symptoms of fever, chills, flank pain, nausea, vomiting, urinary frequency and dysuria.    Stone composition was 100% calcium oxalate and unavailable.     She is a low risk first time stone former and was educated on conservative strategies for risk reduction. She has been on calcium supplementation for Forte[place] for osteoporosis. I encouraged her to continue with calcium supllementation as ordered by PCP with the possible shift of calcium carbonate to more stone friendly calcium citrate.    ROS   Review of systems is negative except for HPI.    Past Medical History:   Diagnosis Date     Arthritis     Hands     Esophageal reflux      HTN (hypertension)      Hypercholesterolemia      Osteoporosis        Past Surgical History:   Procedure Laterality Date     CHEST WALL BIOPSY      Under Right Breast     COLONOSCOPY       COMBINED CYSTOSCOPY, INSERT STENT URETER(S) Left 11/30/2021    Procedure: CYSTOURETEROSCOPY,  AND URETERAL STENT INSERTION;  Surgeon: Lc Rios MD;  Location: [facility] OR     HYSTERECTOMY         Current Outpatient Medications   Medication Sig Dispense Refill     Calcium Carb-Cholecalciferol 600-100 MG-UNIT CAPS        losartan (COZAAR) 50 MG tablet Take 50 mg by mouth       multivitamin w/minerals (MULTI-VITAMIN) tablet Take 1 tablet by mouth daily       Omega-3 Fatty Acids (FISH OIL) 1200 MG capsule Take 2 g by mouth daily        Saccharomyces boulardii (PROBIOTIC) 250 MG CAPS        vitamin B complex with vitamin C (VITAMIN  B COMPLEX) tablet Take 1 tablet by mouth daily       vitamin E (TOCOPHEROL) 200 units (90 mg) capsule Take 200 Units by mouth daily         Allergies   Allergen Reactions     Penicillin G Anaphylaxis     Ciprofloxacin Other (See Comments)     tendonitis     Sulfa Drugs Nausea and Vomiting       Social History     Socioeconomic History     Marital status:      Spouse name: Not on file     Number of  children: Not on file     Years of education: Not on file     Highest education level: Not on file   Occupational History     Not on file   Tobacco Use     Smoking status: Never Smoker     Smokeless tobacco: Never Used   Substance and Sexual Activity     Alcohol use: Not Currently     Drug use: Never     Sexual activity: Not on file   Other Topics Concern     Parent/sibling w/ CABG, MI or angioplasty before 65F 55M? Not Asked   Social History Narrative     Not on file     Social Determinants of Health     Financial Resource Strain: Not on file   Food Insecurity: Not on file   Transportation Needs: Not on file   Physical Activity: Not on file   Stress: Not on file   Social Connections: Not on file   Intimate Partner Violence: Not on file   Housing Stability: Not on file       No family history on file.    Objective:     Appears AAO x 3  No vitals obtained due to virtual visit    Labs   Most Recent 3 CBC's:Recent Labs   Lab Test 10/31/21  0116   WBC 9.7   HGB 12.2   MCV 93        Most Recent 3 BMP's:Recent Labs   Lab Test 10/31/21  0116      POTASSIUM 3.9   CHLORIDE 109*   CO2 25   BUN 15   CR 0.83   ANIONGAP 7   MICHELLE 10.0        Acute Labs Urine Culture  No results found for: CULTURE

## 2022-01-03 NOTE — PATIENT INSTRUCTIONS
Patient Stated Goal: Prevent further stones  Calcium Oxalate Stone Prevention Self Management    Drink more fluids:    Drinking more liquids is the best way you can help prevent future stones. Stones can form when substances in the urine are too concentrated. The more you drink, the more urine you will make. This means all substances in the urine will be less concentrated.    How much urine should I be producing?    The usual recommended daily urine production is about 2 to 3 quarts (0189-1665 ml). If you are producing more than 3 quarts of urine on a regular basis, it is possible to deplete important minerals stored in the body.    To measure the amount of urine you produce in a day, you can either:    Collect all urine in a container and measure at the end of the day     Use a measuring cup each time you urinate and add up the amounts at the end of the day     Observe    Color - Dark nalini urine is concentrated. Light straw color or lighter is dilute and desirable     Odor - Concentrated urine tends to smell stronger. Dilute urine is nearly odorless    Ways to increase your fluid intake    Increasing the amount of fluid you drink is effective for all types of kidney stones. While water is commonly recommended, all fluids are effective for increasing the amount of urine your body produces.    Focus on starting a lifelong habit, rather than a short-term solution.     Keep liquids on hand that you like. Crystal Light is a low calorie appropriate choice.    Drink out of larger glasses. You'll tend to drink more with each serving.     Have an additional glass of fluid with each meal.     Keep a water or drink bottle at work and fill it regularly.     *If you are prone to fluid retention, consult your doctor before making changes to your fluid habits.    Low Oxalate Diet:    Avoid excess amounts or daily consumption of these foods:    All nuts and nut products including peanuts, almonds, pecans, peanut butter, almond  milk    Rhubarb    Chocolate    Soybeans and soy products     Spinach    Wheat Germ    Beets    Maintain a normal calcium diet:    Researches have found that people with low calcium intakes tend to have more stones. Foods with high calcium content are acceptable and include:    Dairy products (including milk, cheese and yogurt)    Meat and fish    Enriched cereals    Dark green vegetables    What about calcium supplements?     Many people take calcium supplements, either on their own or as prescribed by a doctor. Research has indicated that calcium supplements do not usually pose a risk for stone formation.  Calcium citrate is a better choice for a supplement.    Avoid excess salt:    Salt (sodium chloride) is found in abundance in many foods. High sodium levels in the urine can interfere with the kidney's handling of calcium.     Tips for reducing the salt in your diet:    Don't use salt at the table    Reduce the salt used in food preparation. Try 1/2 teaspoon when recipes call for 1 teaspoon.    Use herbs and spices for flavoring instead of salt.    Avoid salty foods.    Check the label before you buy or use a product. Note sodium and portion size information.    Try to consume less than 2,000 mg/day. (1 teaspoon = 2,000 mg)    Foods with high sodium content include:    Processed meat (including luncheon meats, sausage)     Crackers     Instant cereal     Processed cheese     Canned soups     Chips and snack foods     Soy sauce    [facility] can respond to your questions or concerns 24 hours a day at [phone].

## 2023-02-11 ENCOUNTER — HOSPITAL ENCOUNTER (EMERGENCY)
Facility: HOSPITAL | Age: 73
Discharge: HOME | End: 2023-02-12
Attending: EMERGENCY MEDICINE | Admitting: EMERGENCY MEDICINE
Payer: MEDICARE

## 2023-02-11 DIAGNOSIS — S20.419A ABRASION OF BACK, UNSPECIFIED LATERALITY, INITIAL ENCOUNTER: ICD-10-CM

## 2023-02-11 DIAGNOSIS — S52.501A CLOSED FRACTURE OF DISTAL END OF RIGHT RADIUS, UNSPECIFIED FRACTURE MORPHOLOGY, INITIAL ENCOUNTER: ICD-10-CM

## 2023-02-11 DIAGNOSIS — S52.611A CLOSED DISPLACED FRACTURE OF STYLOID PROCESS OF RIGHT ULNA, INITIAL ENCOUNTER: ICD-10-CM
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
## 2023-02-11 PROCEDURE — 96374 THER/PROPH/DIAG INJ IV PUSH: CPT | Mod: 59

## 2023-02-11 PROCEDURE — 96375 TX/PRO/DX INJ NEW DRUG ADDON: CPT

## 2023-02-11 PROCEDURE — 25605 CLTX DST RDL FX/EPHYS SEP W/: CPT | Mod: RT

## 2023-02-11 PROCEDURE — 96376 TX/PRO/DX INJ SAME DRUG ADON: CPT | Mod: 59

## 2023-02-11 PROCEDURE — 99285 EMERGENCY DEPT VISIT HI MDM: CPT | Mod: 25

## 2023-02-11 PROCEDURE — 99152 MOD SED SAME PHYS/QHP 5/>YRS: CPT

## 2023-02-11 PROCEDURE — 96361 HYDRATE IV INFUSION ADD-ON: CPT

## 2023-02-11 RX ORDER — ONDANSETRON 2 MG/ML
4 INJECTION INTRAMUSCULAR; INTRAVENOUS EVERY 30 MIN PRN
Status: COMPLETED | OUTPATIENT
Start: 2023-02-11 | End: 2023-02-12

## 2023-02-11 RX ORDER — HYDROMORPHONE HYDROCHLORIDE 1 MG/ML
0.5 INJECTION, SOLUTION INTRAMUSCULAR; INTRAVENOUS; SUBCUTANEOUS
Status: COMPLETED | OUTPATIENT
Start: 2023-02-11 | End: 2023-02-12

## 2023-02-11 ASSESSMENT — ENCOUNTER SYMPTOMS
NECK PAIN: 0
BACK PAIN: 0

## 2023-02-12 ENCOUNTER — APPOINTMENT (OUTPATIENT)
Dept: RADIOLOGY | Facility: HOSPITAL | Age: 73
End: 2023-02-12
Attending: EMERGENCY MEDICINE
Payer: MEDICARE

## 2023-02-12 VITALS
HEIGHT: 68 IN | RESPIRATION RATE: 16 BRPM | WEIGHT: 138 LBS | TEMPERATURE: 98.2 F | DIASTOLIC BLOOD PRESSURE: 65 MMHG | BODY MASS INDEX: 20.92 KG/M2 | HEART RATE: 70 BPM | OXYGEN SATURATION: 95 % | SYSTOLIC BLOOD PRESSURE: 137 MMHG

## 2023-02-12 LAB
ANION GAP SERPL CALCULATED.3IONS-SCNC: 10 MMOL/L (ref 7–15)
BASOPHILS # BLD AUTO: 0.1 10E3/UL (ref 0–0.2)
BASOPHILS NFR BLD AUTO: 1 %
BUN SERPL-MCNC: 27.3 MG/DL (ref 8–23)
CALCIUM SERPL-MCNC: 9.6 MG/DL (ref 8.8–10.2)
CHLORIDE SERPL-SCNC: 103 MMOL/L (ref 98–107)
CREAT SERPL-MCNC: 0.84 MG/DL (ref 0.51–0.95)
DEPRECATED HCO3 PLAS-SCNC: 24 MMOL/L (ref 22–29)
EOSINOPHIL # BLD AUTO: 0.5 10E3/UL (ref 0–0.7)
EOSINOPHIL NFR BLD AUTO: 4 %
ERYTHROCYTE [DISTWIDTH] IN BLOOD BY AUTOMATED COUNT: 14.1 % (ref 10–15)
GFR SERPL CREATININE-BSD FRML MDRD: 73 ML/MIN/1.73M2
GLUCOSE SERPL-MCNC: 103 MG/DL (ref 70–99)
HCT VFR BLD AUTO: 36.6 % (ref 35–47)
HGB BLD-MCNC: 11.9 G/DL (ref 11.7–15.7)
IMM GRANULOCYTES # BLD: 0.1 10E3/UL
IMM GRANULOCYTES NFR BLD: 1 %
LYMPHOCYTES # BLD AUTO: 3 10E3/UL (ref 0.8–5.3)
LYMPHOCYTES NFR BLD AUTO: 27 %
MCH RBC QN AUTO: 30.4 PG (ref 26.5–33)
MCHC RBC AUTO-ENTMCNC: 32.5 G/DL (ref 31.5–36.5)
MCV RBC AUTO: 94 FL (ref 78–100)
MONOCYTES # BLD AUTO: 1 10E3/UL (ref 0–1.3)
MONOCYTES NFR BLD AUTO: 9 %
NEUTROPHILS # BLD AUTO: 6.7 10E3/UL (ref 1.6–8.3)
NEUTROPHILS NFR BLD AUTO: 58 %
NRBC # BLD AUTO: 0 10E3/UL
NRBC BLD AUTO-RTO: 0 /100
PLATELET # BLD AUTO: 438 10E3/UL (ref 150–450)
POTASSIUM SERPL-SCNC: 3.6 MMOL/L (ref 3.4–5.3)
RBC # BLD AUTO: 3.91 10E6/UL (ref 3.8–5.2)
SODIUM SERPL-SCNC: 137 MMOL/L (ref 136–145)
WBC # BLD AUTO: 11.2 10E3/UL (ref 4–11)

## 2023-02-12 PROCEDURE — 999N000055 HC STATISTIC END TITIAL CO2 MONITORING

## 2023-02-12 PROCEDURE — 250N000011 HC RX IP 250 OP 636: Performed by: EMERGENCY MEDICINE

## 2023-02-12 PROCEDURE — 80048 BASIC METABOLIC PNL TOTAL CA: CPT | Performed by: EMERGENCY MEDICINE

## 2023-02-12 PROCEDURE — 73110 X-RAY EXAM OF WRIST: CPT | Mod: RT

## 2023-02-12 PROCEDURE — 258N000003 HC RX IP 258 OP 636: Performed by: EMERGENCY MEDICINE

## 2023-02-12 PROCEDURE — 85025 COMPLETE CBC W/AUTO DIFF WBC: CPT | Performed by: EMERGENCY MEDICINE

## 2023-02-12 PROCEDURE — 36415 COLL VENOUS BLD VENIPUNCTURE: CPT | Performed by: EMERGENCY MEDICINE

## 2023-02-12 PROCEDURE — 250N000009 HC RX 250: Performed by: EMERGENCY MEDICINE

## 2023-02-12 PROCEDURE — 999N000065 XR WRIST RIGHT G/E 3 VIEWS: Mod: RT

## 2023-02-12 PROCEDURE — 73090 X-RAY EXAM OF FOREARM: CPT | Mod: RT

## 2023-02-12 RX ORDER — ONDANSETRON 4 MG/1
4 TABLET, ORALLY DISINTEGRATING ORAL EVERY 8 HOURS PRN
Qty: 10 TABLET | Refills: 0 | Status: SHIPPED | OUTPATIENT
Start: 2023-02-12 | End: 2023-02-15

## 2023-02-12 RX ORDER — PROPOFOL 10 MG/ML
50 INJECTION, EMULSION INTRAVENOUS ONCE
Status: COMPLETED | OUTPATIENT
Start: 2023-02-12 | End: 2023-02-12

## 2023-02-12 RX ORDER — GINSENG 100 MG
CAPSULE ORAL ONCE
Status: COMPLETED | OUTPATIENT
Start: 2023-02-12 | End: 2023-02-12

## 2023-02-12 RX ORDER — SENNA AND DOCUSATE SODIUM 50; 8.6 MG/1; MG/1
1 TABLET, FILM COATED ORAL 2 TIMES DAILY PRN
Qty: 20 TABLET | Refills: 0 | Status: SHIPPED | OUTPATIENT
Start: 2023-02-12

## 2023-02-12 RX ORDER — OXYCODONE AND ACETAMINOPHEN 5; 325 MG/1; MG/1
1 TABLET ORAL EVERY 6 HOURS PRN
Qty: 12 TABLET | Refills: 0 | Status: SHIPPED | OUTPATIENT
Start: 2023-02-12 | End: 2023-02-15

## 2023-02-12 RX ORDER — ONDANSETRON 2 MG/ML
4 INJECTION INTRAMUSCULAR; INTRAVENOUS EVERY 30 MIN PRN
Status: DISCONTINUED | OUTPATIENT
Start: 2023-02-12 | End: 2023-02-12 | Stop reason: HOSPADM

## 2023-02-12 RX ADMIN — ONDANSETRON 4 MG: 2 INJECTION INTRAMUSCULAR; INTRAVENOUS at 04:44

## 2023-02-12 RX ADMIN — PROPOFOL 40 MG: 10 INJECTION, EMULSION INTRAVENOUS at 02:10

## 2023-02-12 RX ADMIN — HYDROMORPHONE HYDROCHLORIDE 0.5 MG: 1 INJECTION, SOLUTION INTRAMUSCULAR; INTRAVENOUS; SUBCUTANEOUS at 00:02

## 2023-02-12 RX ADMIN — BACITRACIN: 500 OINTMENT TOPICAL at 04:41

## 2023-02-12 RX ADMIN — HYDROMORPHONE HYDROCHLORIDE 0.5 MG: 1 INJECTION, SOLUTION INTRAMUSCULAR; INTRAVENOUS; SUBCUTANEOUS at 03:02

## 2023-02-12 RX ADMIN — HYDROMORPHONE HYDROCHLORIDE 0.5 MG: 1 INJECTION, SOLUTION INTRAMUSCULAR; INTRAVENOUS; SUBCUTANEOUS at 02:08

## 2023-02-12 RX ADMIN — ONDANSETRON 4 MG: 2 INJECTION INTRAMUSCULAR; INTRAVENOUS at 05:17

## 2023-02-12 RX ADMIN — ONDANSETRON 4 MG: 2 INJECTION INTRAMUSCULAR; INTRAVENOUS at 00:01

## 2023-02-12 RX ADMIN — SODIUM CHLORIDE 1000 ML: 9 INJECTION, SOLUTION INTRAVENOUS at 03:15

## 2023-02-12 ASSESSMENT — ACTIVITIES OF DAILY LIVING (ADL)
ADLS_ACUITY_SCORE: 35

## 2023-02-12 NOTE — ED TRIAGE NOTES
Patient arrives by [facility] from home. She was getting out of bath tub when she caught her foot up in something and lost her balance, falling backwards and attempted to catch herself. Has right arm deformities, EMS splinted. No LOC. No blood thinners.     Triage Assessment       Row Name 02/11/23 8219       Triage Assessment (Adult)    Airway WDL WDL       Respiratory WDL    Respiratory WDL WDL       Skin Circulation/Temperature WDL    Skin Circulation/Temperature WDL WDL       Cardiac WDL    Cardiac WDL WDL       Peripheral/Neurovascular WDL    Peripheral Neurovascular WDL WDL;pulse assessment;neurovascular assessment upper       LUE Neurovascular Assessment    Temperature LUE warm    Color LUE no discoloration    Sensation LUE no numbness;no tenderness;no tingling       RUE Neurovascular Assessment    Temperature RUE warm    Color RUE no discoloration    Sensation RUE no numbness;no tenderness;no tingling       Cognitive/Neuro/Behavioral WDL    Cognitive/Neuro/Behavioral WDL WDL

## 2023-02-12 NOTE — ED NOTES
Patient discharged from ED. Discharge paperwork discussed, prescriptions and AVS in hand, no questions at this time and patient and  agreeable/understanding about discharge.

## 2023-02-12 NOTE — ED NOTES
Bed: [place] [address]  Expected date: 2/11/23  Expected time: 11:19 PM  Means of arrival: Ambulance  Comments:  MPL-=72yof fall, obvious arm deformity

## 2023-02-12 NOTE — ED PROVIDER NOTES
NAME: Rossana Rice  AGE: 72 year old female  YOB: 1950  MRN: [id number]  EVALUATION DATE & TIME: 2023 11:28 PM    PCP: Taylor Mcbride    ED PROVIDER: Fish Frausto M.D.      Chief Complaint   Patient presents with     Fall     Arm Injury     FINAL IMPRESSION:  1. Closed fracture of distal end of right radius, unspecified fracture morphology, initial encounter    2. Closed displaced fracture of styloid process of right ulna, initial encounter    3. Fall, initial encounter    4. Abrasion of back, unspecified laterality, initial encounter        MEDICAL DECISION MAKIN:41 PM I met with the patient, obtained history, performed an initial exam, and discussed options and plan for diagnostics and treatment here in the ED.   Patient was clinically assessed and consented to treatment. After assessment, medical decision making and workup were discussed with the patient. The patient was agreeable to plan for testing, workup, and treatment.  Pertinent Labs & Imaging studies reviewed. (See chart for details)    1:31 AM I obtained the patient's consent for a sedation and reduction procedure     2:06 AM I sedated and reduced the patient's right wrist    3:06 AM The nurse reports that the patient's blood pressure has dropped to 88/44 and the patient complains they can't feel their right fingers    5:01 AM The nurse reports that during their road test, the patient complained of lightheadedness and nausea. The patient reports not having eaten much. Nurse will give the patient apple juice and zofran    6:12 AM The patient passed their road test and is feeling better. The patient is comfortable with discharge          Medical Decision Making    History:    Supplemental history from: Documented in chart, if applicable and Family Member/Significant Other    External Record(s) reviewed: Documented in chart, if applicable.    Work Up:    Chart documentation includes differential considered and any  EKGs or imaging independently interpreted by provider, where specified.    In additional to work up documented, I considered the following work up: Documented in chart, if applicable.    External consultation:    Discussion of management with another provider: Documented in chart, if applicable    Complicating factors:    Care impacted by chronic illness: Hypertension    Care affected by social determinants of health: N/A    Disposition considerations: Discharge. I prescribed additional prescription strength medication(s) as charted. I considered admission, but discharged patient after significant clinical improvement.    Rossana Rice is a 72 year old female who presents with fall and right arm injury.   Differential diagnosis includes but not limited to radial fracture, distal ulnar fracture, scaphoid fracture, back abrasion, back contusion.  Patient with slip and fall in the shower resulting in injury to her right arm.  Patient has no evidence of head injury, no neck pain, no back pain despite previous surgery there.  Patient does have an abrasion to her middle back but not significantly tender around the area there is a slight contusion.  Patient mainly with pain and deformity of the right arm.  Given the findings likely a radial and ulnar fracture however will get x-rays first.  X-rays were obtained and did show a distal radial fracture with angulation and displacement as well as a ulnar styloid fracture.  Patient was consented for sedation for reduction.  With sedation patient was able to be reduced with some interval improvement in alignment and splint placement.  After splinting patient did report some tingling in her fingers and splint was released slightly however did not wish to allow fracture to free angulate or displace from interval improvement.  Splint fiberglass was kept in place with reduced tension on the Ace wrap and following recheck intermittently improvement in the numbness and tingling as  well as the pain  Following the reduction.  Patient comfortable and we discussed going home however patient became nauseous from narcotic pain medication.  IV fluids were given, Zofran, patient was watched in the ER.  Patient resting comfortably and continue reassessment with improvement.  Following observation in the ER for recovery from the IV narcotic medication patient continued to improve and was breathing comfortably with no further nausea and ambulating without difficulty.  We discussed home care and follow-up with orthopedics for discussion of possible operative repair of the distal radius fracture.  Patient comfortable this plan and splint will be kept in place with the pain medication to go home on.    0 minutes of critical care time    MEDICATIONS GIVEN IN THE EMERGENCY:  Medications   ondansetron (ZOFRAN) injection 4 mg (has no administration in time range)   ondansetron (ZOFRAN) injection 4 mg (4 mg Intravenous Given 2/12/23 0517)   HYDROmorphone (PF) (DILAUDID) injection 0.5 mg (0.5 mg Intravenous Given 2/12/23 0302)   propofol (DIPRIVAN) injection 10 mg/mL vial (40 mg Intravenous Given 2/12/23 0210)   0.9% sodium chloride BOLUS (0 mLs Intravenous Stopped 2/12/23 0453)   bacitracin ointment ( Topical Given 2/12/23 0441)       NEW PRESCRIPTIONS STARTED AT TODAY'S ER VISIT:  Discharge Medication List as of 2/12/2023  6:14 AM      START taking these medications    Details   ondansetron (ZOFRAN ODT) 4 MG ODT tab Take 1 tablet (4 mg) by mouth every 8 hours as needed for nausea, Disp-10 tablet, R-0, Local Print      oxyCODONE-acetaminophen (PERCOCET) 5-325 MG tablet Take 1 tablet by mouth every 6 hours as needed for pain, Disp-12 tablet, R-0, Local Print      SENNA-docusate sodium (SENNA S) 8.6-50 MG tablet Take 1 tablet by mouth 2 times daily as needed (constipation with pain medication), Disp-20 tablet, R-0, Local Print             "    =================================================================    HPI    Patient information was obtained from: Patient and patient's      Use of : N/A       Rossana Rice is a 72 year old female with a past medical history of lumbar decompression- foraminotomy (11/14/22), HTN, and osteoarthritis, who presents with arm pain after a fall    The patient's  states that the patient, \"fell getting out of the bathtub\" prior to arrival. They also note that the patient has a wound to their back. The patient reports their \"leg caught\" which caused them to fall on their right side. The patient notes they \"didn't land on [their] wrist\", but is having right forearm pain and their \"bottom is sore\". The patient denies any back pain, neck pain, right shoulder pain, or right elbow pain. The patient is able to move their right fingers and denies any tingling or numbness.  The patient's right arm is currently in a splint placed by EMS.  Per [facility], the patient's last Tdap vaccine was 7/5/2017      REVIEW OF SYSTEMS   Review of Systems   Musculoskeletal: Negative for back pain and neck pain.        Right arm pain  Buttock pain   All other systems reviewed and are negative.       PAST MEDICAL HISTORY:  Past Medical History:   Diagnosis Date     Arthritis     Hands     Esophageal reflux      HTN (hypertension)      Hypercholesterolemia      Osteoporosis        PAST SURGICAL HISTORY:  Past Surgical History:   Procedure Laterality Date     CHEST WALL BIOPSY      Under Right Breast     COLONOSCOPY       COMBINED CYSTOSCOPY, INSERT STENT URETER(S) Left 11/30/2021    Procedure: CYSTOURETEROSCOPY,  AND URETERAL STENT INSERTION;  Surgeon: Lc Rios MD;  Location: [facility]     HYSTERECTOMY         CURRENT MEDICATIONS:      Current Facility-Administered Medications:      ondansetron (ZOFRAN) injection 4 mg, 4 mg, Intravenous, Q30 Min PRN, Fish Frausto MD    Current Outpatient " "Medications:      ondansetron (ZOFRAN ODT) 4 MG ODT tab, Take 1 tablet (4 mg) by mouth every 8 hours as needed for nausea, Disp: 10 tablet, Rfl: 0     oxyCODONE-acetaminophen (PERCOCET) 5-325 MG tablet, Take 1 tablet by mouth every 6 hours as needed for pain, Disp: 12 tablet, Rfl: 0     SENNA-docusate sodium (SENNA S) 8.6-50 MG tablet, Take 1 tablet by mouth 2 times daily as needed (constipation with pain medication), Disp: 20 tablet, Rfl: 0     Calcium Carb-Cholecalciferol 600-100 MG-UNIT CAPS, , Disp: , Rfl:      losartan (COZAAR) 50 MG tablet, Take 50 mg by mouth, Disp: , Rfl:      multivitamin w/minerals (MULTI-VITAMIN) tablet, Take 1 tablet by mouth daily, Disp: , Rfl:      Omega-3 Fatty Acids (FISH OIL) 1200 MG capsule, Take 2 g by mouth daily , Disp: , Rfl:      Saccharomyces boulardii (PROBIOTIC) 250 MG CAPS, , Disp: , Rfl:      vitamin B complex with vitamin C (VITAMIN  B COMPLEX) tablet, Take 1 tablet by mouth daily, Disp: , Rfl:      vitamin E (TOCOPHEROL) 200 units (90 mg) capsule, Take 200 Units by mouth daily, Disp: , Rfl:     ALLERGIES:  Allergies   Allergen Reactions     Penicillin G Anaphylaxis     Ciprofloxacin Other (See Comments)     tendonitis     Sulfa Drugs Nausea and Vomiting       FAMILY HISTORY:  No family history on file.    SOCIAL HISTORY:   Social History     Socioeconomic History     Marital status:    Tobacco Use     Smoking status: Never     Smokeless tobacco: Never   Substance and Sexual Activity     Alcohol use: Not Currently     Drug use: Never       PHYSICAL EXAM:    Vitals: /65   Pulse 70   Temp 98.2  F (36.8  C) (Oral)   Resp 16   Ht 1.727 m (5' 8\")   Wt 62.6 kg (138 lb)   SpO2 95%   BMI 20.98 kg/m     Physical Exam  Vitals and nursing note reviewed.   Constitutional:       General: She is not in acute distress.     Appearance: Normal appearance. She is normal weight. She is not ill-appearing or toxic-appearing.   HENT:      Head: Normocephalic and " atraumatic.      Nose: Nose normal.      Mouth/Throat:      Comments: No midface instability or injury.  Eyes:      Extraocular Movements: Extraocular movements intact.      Pupils: Pupils are equal, round, and reactive to light.   Cardiovascular:      Rate and Rhythm: Normal rate and regular rhythm.      Heart sounds: Normal heart sounds.   Pulmonary:      Effort: Pulmonary effort is normal. No respiratory distress.      Breath sounds: Normal breath sounds.   Chest:      Chest wall: No tenderness.   Abdominal:      Palpations: Abdomen is soft.      Tenderness: There is no abdominal tenderness. There is no guarding or rebound.   Musculoskeletal:         General: Swelling, tenderness, deformity and signs of injury present.      Right wrist: Swelling, deformity, tenderness, bony tenderness, snuff box tenderness and crepitus present. Decreased range of motion. Normal pulse.      Cervical back: Normal range of motion and neck supple. No tenderness or bony tenderness.      Thoracic back: No swelling, tenderness or bony tenderness.      Lumbar back: No tenderness or bony tenderness.        Back:    Skin:     General: Skin is warm and dry.      Capillary Refill: Capillary refill takes less than 2 seconds.      Findings: No erythema.   Neurological:      General: No focal deficit present.      Mental Status: She is alert.      Cranial Nerves: No cranial nerve deficit.      Sensory: No sensory deficit.      Coordination: Coordination normal.   Psychiatric:         Behavior: Behavior normal.           LAB:  All pertinent labs reviewed and interpreted.  Labs Ordered and Resulted from Time of ED Arrival to Time of ED Departure   BASIC METABOLIC PANEL - Abnormal       Result Value    Sodium 137      Potassium 3.6      Chloride 103      Carbon Dioxide (CO2) 24      Anion Gap 10      Urea Nitrogen 27.3 (*)     Creatinine 0.84      Calcium 9.6      Glucose 103 (*)     GFR Estimate 73     CBC WITH PLATELETS AND DIFFERENTIAL -  Abnormal    WBC Count 11.2 (*)     RBC Count 3.91      Hemoglobin 11.9      Hematocrit 36.6      MCV 94      MCH 30.4      MCHC 32.5      RDW 14.1      Platelet Count 438      % Neutrophils 58      % Lymphocytes 27      % Monocytes 9      % Eosinophils 4      % Basophils 1      % Immature Granulocytes 1      NRBCs per 100 WBC 0      Absolute Neutrophils 6.7      Absolute Lymphocytes 3.0      Absolute Monocytes 1.0      Absolute Eosinophils 0.5      Absolute Basophils 0.1      Absolute Immature Granulocytes 0.1      Absolute NRBCs 0.0         RADIOLOGY:  XR Wrist Right 3 Views   Final Result   IMPRESSION: Interval placement of casting material. Improved alignment of the distal radial fracture fragment.      XR Wrist Right 3 Views   Final Result   IMPRESSION:      Diffuse osteopenia.      Acute, displaced, angulated, transverse fracture of the distal radial metaphysis. Distal fracture fragment is dorsally displaced and dorsally angulated. No definite intra-articular extension.      Acute displaced ulnar styloid avulsion fracture.      No dislocation. Moderate degenerative changes of the first carpometacarpal and triscaphe joints.      Soft tissue swelling about the wrist.      Radius/Ulna XR, PA & LAT, right   Final Result   IMPRESSION:      Diffuse osteopenia.      Acute, displaced, angulated, transverse fracture of the distal radial metaphysis. Distal fracture fragment is dorsally displaced and dorsally angulated. No definite intra-articular extension.      Acute displaced ulnar styloid avulsion fracture.      No dislocation. Moderate degenerative changes of the first carpometacarpal and triscaphe joints.      Soft tissue swelling about the wrist.          PROCEDURES:   [facility]    -Fracture    Date/Time: 2/12/2023 8:00 AM  Performed by: Fish Frausto MD  Authorized by: Fish Frausto MD     Risks, benefits and alternatives discussed.    ED EVALUATION:       Assessment Time: 2/12/2023 1:30 AM      I have performed an Emergency Department Evaluation including taking a history and physical examination, this evaluation will be documented in the electronic medical record for this ED encounter.     Indication: Right distal radial fracture with angulation and displacement    ASA Class: Class 2- mild systemic disease, no acute problems, no functional limitations    Mallampati: Grade 1- soft palate, uvula, tonsillar pillars, and posterior pharyngeal wall visible    NPO Status: yes    UNIVERSAL PROTOCOL   Site Marked: NA  Prior Images Obtained and Reviewed:  Yes  Required items: Required blood products, implants, devices and special equipment available    Patient identity confirmed:  Arm band and verbally with patient  Patient was reevaluated immediately before administering moderate or deep sedation or anesthesia  Confirmation Checklist:  Patient's identity using two indicators  Time out: Immediately prior to the procedure a time out was called    Universal Protocol: the Joint Commission Universal Protocol was followed    Preparation: Patient was prepped and draped in usual sterile fashion      INJURY      Injury location:  Forearm    Forearm injury location:  R forearm    Forearm fracture type: distal radius and ulnar styloid      PRE PROCEDURE ASSESSMENT      Neurological function: normal      Distal perfusion: normal      Range of motion: reduced      SEDATION  Patient Sedated: Yes    Sedation Type:  Moderate (conscious) sedation  Sedation:  Propofol  Vital signs: Vital signs monitored during sedation      ANESTHESIA (see MAR for exact dosages)      Anesthesia method:  None    PROCEDURE DETAILS:     Manipulation performed: yes      Skin traction used: yes      X-ray confirmed reduction: yes      Immobilization:  Splint and sling    Splint type:  Sugar tong    Supplies used:  Ortho-Glass    POST PROCEDURE ASSESSMENT      Neurological function: diminished      Distal  perfusion: normal      Range of motion: improved        PROCEDURE  Describe Procedure: Initial tingling in the fingers was improved after releasing the distal end of the splint slightly and then repositioning Ace wrap  Patient Tolerance:  Patient tolerated the procedure well with no immediate complications  Length of time physician/provider present for 1:1 monitoring during sedation: 10         I, Teodora Camacho, am serving as a scribe to document services personally performed by Dr. Fish Frausto  based on my observation and the provider's statements to me. I, Fish Frausto MD attest that Teodora Camacho is acting in a scribe capacity, has observed my performance of the services and has documented them in accordance with my direction.      Fish Frausto M.D.  Emergency Medicine  [facility] Emergency Department     Fish Frausto MD  02/12/23 0816

## 2025-02-11 ENCOUNTER — HOSPITAL ENCOUNTER (EMERGENCY)
Facility: HOSPITAL | Age: 75
Discharge: HOME | End: 2025-02-11
Attending: EMERGENCY MEDICINE | Admitting: EMERGENCY MEDICINE
Payer: MEDICARE

## 2025-02-11 ENCOUNTER — APPOINTMENT (OUTPATIENT)
Dept: RADIOLOGY | Facility: HOSPITAL | Age: 75
End: 2025-02-11
Attending: EMERGENCY MEDICINE
Payer: MEDICARE

## 2025-02-11 VITALS
SYSTOLIC BLOOD PRESSURE: 154 MMHG | OXYGEN SATURATION: 96 % | TEMPERATURE: 98.2 F | DIASTOLIC BLOOD PRESSURE: 69 MMHG | HEART RATE: 76 BPM | RESPIRATION RATE: 17 BRPM | WEIGHT: 140 LBS | BODY MASS INDEX: 21.29 KG/M2

## 2025-02-11 DIAGNOSIS — R42 DIZZINESS: ICD-10-CM

## 2025-02-11 DIAGNOSIS — R07.9 CHEST PAIN, UNSPECIFIED TYPE: ICD-10-CM

## 2025-02-11 DIAGNOSIS — J32.9 SINUSITIS, UNSPECIFIED CHRONICITY, UNSPECIFIED LOCATION: ICD-10-CM

## 2025-02-11 LAB
ALBUMIN SERPL BCG-MCNC: 4.7 G/DL (ref 3.5–5.2)
ALP SERPL-CCNC: 100 U/L (ref 40–150)
ALT SERPL W P-5'-P-CCNC: 24 U/L (ref 0–50)
ANION GAP SERPL CALCULATED.3IONS-SCNC: 10 MMOL/L (ref 7–15)
AST SERPL W P-5'-P-CCNC: 26 U/L (ref 0–45)
BILIRUB SERPL-MCNC: 0.3 MG/DL
BUN SERPL-MCNC: 15 MG/DL (ref 8–23)
CALCIUM SERPL-MCNC: 10.5 MG/DL (ref 8.8–10.4)
CHLORIDE SERPL-SCNC: 105 MMOL/L (ref 98–107)
CREAT SERPL-MCNC: 0.73 MG/DL (ref 0.51–0.95)
EGFRCR SERPLBLD CKD-EPI 2021: 86 ML/MIN/1.73M2
ERYTHROCYTE [DISTWIDTH] IN BLOOD BY AUTOMATED COUNT: 13.5 % (ref 10–15)
GLUCOSE SERPL-MCNC: 113 MG/DL (ref 70–99)
HCO3 SERPL-SCNC: 26 MMOL/L (ref 22–29)
HCT VFR BLD AUTO: 40.1 % (ref 35–47)
HGB BLD-MCNC: 13 G/DL (ref 11.7–15.7)
MCH RBC QN AUTO: 29.8 PG (ref 26.5–33)
MCHC RBC AUTO-ENTMCNC: 32.4 G/DL (ref 31.5–36.5)
MCV RBC AUTO: 92 FL (ref 78–100)
PLATELET # BLD AUTO: 627 10E3/UL (ref 150–450)
POTASSIUM SERPL-SCNC: 4.4 MMOL/L (ref 3.4–5.3)
PROT SERPL-MCNC: 7.7 G/DL (ref 6.4–8.3)
RBC # BLD AUTO: 4.36 10E6/UL (ref 3.8–5.2)
SODIUM SERPL-SCNC: 141 MMOL/L (ref 135–145)
TROPONIN T SERPL HS-MCNC: 10 NG/L
TROPONIN T SERPL HS-MCNC: 9 NG/L
WBC # BLD AUTO: 11.8 10E3/UL (ref 4–11)

## 2025-02-11 PROCEDURE — 93005 ELECTROCARDIOGRAM TRACING: CPT | Performed by: EMERGENCY MEDICINE

## 2025-02-11 PROCEDURE — 36415 COLL VENOUS BLD VENIPUNCTURE: CPT | Performed by: EMERGENCY MEDICINE

## 2025-02-11 PROCEDURE — 85018 HEMOGLOBIN: CPT | Performed by: EMERGENCY MEDICINE

## 2025-02-11 PROCEDURE — 85049 AUTOMATED PLATELET COUNT: CPT | Performed by: EMERGENCY MEDICINE

## 2025-02-11 PROCEDURE — 84155 ASSAY OF PROTEIN SERUM: CPT | Performed by: EMERGENCY MEDICINE

## 2025-02-11 PROCEDURE — 84484 ASSAY OF TROPONIN QUANT: CPT | Performed by: EMERGENCY MEDICINE

## 2025-02-11 PROCEDURE — 71046 X-RAY EXAM CHEST 2 VIEWS: CPT

## 2025-02-11 PROCEDURE — 99285 EMERGENCY DEPT VISIT HI MDM: CPT | Mod: 25

## 2025-02-11 RX ORDER — DOXYCYCLINE 100 MG/1
100 CAPSULE ORAL 2 TIMES DAILY
Qty: 14 CAPSULE | Refills: 0 | Status: SHIPPED | OUTPATIENT
Start: 2025-02-11 | End: 2025-02-18

## 2025-02-11 RX ORDER — MECLIZINE HYDROCHLORIDE 25 MG/1
25 TABLET ORAL 3 TIMES DAILY PRN
Qty: 15 TABLET | Refills: 0 | Status: SHIPPED | OUTPATIENT
Start: 2025-02-11

## 2025-02-11 ASSESSMENT — ACTIVITIES OF DAILY LIVING (ADL): ADLS_ACUITY_SCORE: 41

## 2025-02-11 ASSESSMENT — COLUMBIA-SUICIDE SEVERITY RATING SCALE - C-SSRS
1. IN THE PAST MONTH, HAVE YOU WISHED YOU WERE DEAD OR WISHED YOU COULD GO TO SLEEP AND NOT WAKE UP?: NO
6. HAVE YOU EVER DONE ANYTHING, STARTED TO DO ANYTHING, OR PREPARED TO DO ANYTHING TO END YOUR LIFE?: NO
2. HAVE YOU ACTUALLY HAD ANY THOUGHTS OF KILLING YOURSELF IN THE PAST MONTH?: NO

## 2025-02-12 NOTE — ED PROVIDER NOTES
EMERGENCY DEPARTMENT ENCOUNTER      NAME: Rossana Rice  AGE: 74 year old female  YOB: 1950  MRN: [id number]  EVALUATION DATE & TIME: No admission date for patient encounter.    PCP: Taylor Mcbride    ED PROVIDER: Lexie Comer MD      Chief Complaint   Patient presents with    Dizziness         FINAL IMPRESSION:  1. Sinusitis, unspecified chronicity, unspecified location    2. Dizziness    3. Chest pain, unspecified type          ED COURSE & MEDICAL DECISION MAKING:    Pertinent Labs & Imaging studies reviewed. (See chart for details)  74 year old female presents to the Emergency Department for evaluation of new chest discomfort that started today and has been on and off.  She has had a couple months of dizziness thought to be related to congestion and sinus drainage.  She has seen her primary care provider for this.  Patient's chest pain is atypical for ACS.  ECG with no acute ischemic changes.  Initial troponin is unremarkable.  Plan for second troponin and if unchanged, will discharge the patient home with follow-up with rapid access cardiology clinic.  Will also place patient on antibiotics for sinusitis that has been persistent for the last 2 months and also refer the patient to physical rehab for management of dizziness.  I do not suspect acute central pathology for the patient's dizziness as it has been ongoing for the last 2 months, did improve with what she describes as Epley maneuvers and is related to congestion and sinus drainage.  No acute symptoms today related to the dizziness.    10:19 PM patient signed out to Dr. Frausto with pending repeat troponin.    At the conclusion of the encounter I discussed the results of all of the tests and the disposition. The questions were answered. The patient or family acknowledged understanding and was agreeable with the care plan.       Medical Decision Making  Obtained supplemental history:Supplemental history obtained?: No  Reviewed  "external records: External records reviewed?: Outpatient Record: 02/11/2025 (earlier today) [facility]  Care impacted by chronic illness:Hypertension and Other: esophageal reflux, osteoporosis  Did you consider but not order tests?: Work up considered but not performed and documented in chart, if applicable  Did you interpret images independently?: Independent interpretation of ECG and images noted in documentation, when applicable.  Consultation discussion with other provider:Did you involve another provider (consultant, , pharmacy, etc.)?: No  Admission considered. Patient was signed out to the oncoming physician, disposition pending.    MIPS: Not Applicable        MEDICATIONS GIVEN IN THE EMERGENCY:  Medications - No data to display    NEW PRESCRIPTIONS STARTED AT TODAY'S ER VISIT  New Prescriptions    DOXYCYCLINE HYCLATE (VIBRAMYCIN) 100 MG CAPSULE    Take 1 capsule (100 mg) by mouth 2 times daily for 7 days.    MECLIZINE (ANTIVERT) 25 MG TABLET    Take 1 tablet (25 mg) by mouth 3 times daily as needed.          =================================================================    HPI    Patient information was obtained from: Patient    Use of : N/A         Rossana Rice is a 74 year old female with a pertinent history of esophageal reflux, hypertension, hypercholesterolemia, osteoporosis, CT coronary done last year with a score of 0, no cardiac history, who presents to this ED for evaluation of chest pressure.    For the past 2 months initially started as congestion and sinus drainage, she has endorsed intermittent dizziness episodes. She has seen her primary care provider for these episodes. She has attempted dizziness maneuvers learned online which seemed to improve symptoms. Since an onset earlier today (02/11/2025) at 9:30 AM (~11 hours ago) while sitting, she started to endorse a \"hot pressure\" like sensation across her chest. She did not feel shortness of " breath or dizziness. This sensation occurred on and off all day. She denies fever, chills, sweats, or any other symptoms at this time.      Per chart review, 02/11/2025 (earlier today) [facility] for evaluation of lightheadedness and a burning chest pain across her chest that began earlier in the day at 9:30 AM (~10 hrs ago). EKG taken was unremarkable. Her blood pressure was 207/83.  Disposition was to the Emergency Department.      PAST MEDICAL HISTORY:  Past Medical History:   Diagnosis Date    Arthritis     Hands    Esophageal reflux     HTN (hypertension)     Hypercholesterolemia     Osteoporosis        PAST SURGICAL HISTORY:  Past Surgical History:   Procedure Laterality Date    CHEST WALL BIOPSY      Under Right Breast    COLONOSCOPY      COMBINED CYSTOSCOPY, INSERT STENT URETER(S) Left 11/30/2021    Procedure: CYSTOURETEROSCOPY,  AND URETERAL STENT INSERTION;  Surgeon: Lc Rios MD;  Location: [facility]    HYSTERECTOMY             CURRENT MEDICATIONS:    doxycycline hyclate (VIBRAMYCIN) 100 MG capsule  meclizine (ANTIVERT) 25 MG tablet  Calcium Carb-Cholecalciferol 600-100 MG-UNIT CAPS  losartan (COZAAR) 50 MG tablet  multivitamin w/minerals (MULTI-VITAMIN) tablet  Omega-3 Fatty Acids (FISH OIL) 1200 MG capsule  Saccharomyces boulardii (PROBIOTIC) 250 MG CAPS  SENNA-docusate sodium (SENNA S) 8.6-50 MG tablet  vitamin B complex with vitamin C (VITAMIN  B COMPLEX) tablet  vitamin E (TOCOPHEROL) 200 units (90 mg) capsule        ALLERGIES:  Allergies   Allergen Reactions    Penicillin G Anaphylaxis    Ciprofloxacin Other (See Comments)     tendonitis    Sulfa Antibiotics Nausea and Vomiting       FAMILY HISTORY:  No family history on file.    SOCIAL HISTORY:   Social History     Socioeconomic History    Marital status:    Tobacco Use    Smoking status: Never    Smokeless tobacco: Never   Substance and Sexual Activity    Alcohol use: Not Currently    Drug  use: Never     Social Drivers of Health     Financial Resource Strain: Low Risk  (5/7/2024)    Received from [facility]    Financial Resource Strain     Difficulty of Paying Living Expenses: 3   Food Insecurity: No Food Insecurity (10/21/2024)    Received from [facility]    Food Insecurity     Do you worry your food will run out before you are able to buy more?: 1   Transportation Needs: No Transportation Needs (10/21/2024)    Received from [facility]    Transportation Needs     Does lack of transportation keep you from medical appointments?: 1     Does lack of transportation keep you from work, meetings or getting things that you need?: 1   Social Connections: Socially Integrated (10/21/2024)    Received from [facility]    Social Connections     Do you often feel lonely or isolated from those around you?: 0   Housing Stability: Low Risk  (10/21/2024)    Received from [facility]    Housing Stability     What is your housing situation today?: 1       VITALS:  BP (!) 185/82   Pulse 82   Temp 98.2  F (36.8  C)   Resp 17   Wt 63.5 kg (140 lb)   SpO2 98%   BMI 21.29 kg/m      PHYSICAL EXAM    Gen:  Alert, awake, NAD  HENT:  Head atraumatic, PERRL, EOMI, no meningismus  Respiratory:  CTA, normal respiratory rate  Cardiovascular:  Regular rate and rhythm, no murmur  Abdomen:  Soft, nontender, normoactive bowel sounds  Musculoskeletal:  FROM in all extremities with no tenderness  Integument:  No rash, warm, dry  Neuro:  GCS 15, A & O x 3, CN II - XII intact, o nystagmus, visual fields full to confrontation, normal gait, +5/5 strength in all extremities       LAB:  All pertinent labs reviewed and interpreted.  Results for orders placed or performed during the hospital encounter of 02/11/25   Chest XR,  PA & LAT    Impression    IMPRESSION: Negative  chest.   CBC (+ platelets, no diff)   Result Value Ref Range    WBC Count 11.8 (H) 4.0 - 11.0 10e3/uL    RBC Count 4.36 3.80 - 5.20 10e6/uL    Hemoglobin 13.0 11.7 - 15.7 g/dL    Hematocrit 40.1 35.0 - 47.0 %    MCV 92 78 - 100 fL    MCH 29.8 26.5 - 33.0 pg    MCHC 32.4 31.5 - 36.5 g/dL    RDW 13.5 10.0 - 15.0 %    Platelet Count 627 (H) 150 - 450 10e3/uL   Result Value Ref Range    Troponin T, High Sensitivity 9 <=14 ng/L   Comprehensive metabolic panel   Result Value Ref Range    Sodium 141 135 - 145 mmol/L    Potassium 4.4 3.4 - 5.3 mmol/L    Carbon Dioxide (CO2) 26 22 - 29 mmol/L    Anion Gap 10 7 - 15 mmol/L    Urea Nitrogen 15.0 8.0 - 23.0 mg/dL    Creatinine 0.73 0.51 - 0.95 mg/dL    GFR Estimate 86 >60 mL/min/1.73m2    Calcium 10.5 (H) 8.8 - 10.4 mg/dL    Chloride 105 98 - 107 mmol/L    Glucose 113 (H) 70 - 99 mg/dL    Alkaline Phosphatase 100 40 - 150 U/L    AST 26 0 - 45 U/L    ALT 24 0 - 50 U/L    Protein Total 7.7 6.4 - 8.3 g/dL    Albumin 4.7 3.5 - 5.2 g/dL    Bilirubin Total 0.3 <=1.2 mg/dL       RADIOLOGY:  Reviewed all pertinent imaging. Please see official radiology report.  Chest XR,  PA & LAT   Final Result   IMPRESSION: Negative chest.          EKG:    Performed at: 11-Feb-2025 19:46:51    Impression: Normal sinus rhythm. Normal ECG.    Rate: 74  Rhythm: Normal sinus rhythm.    AR Interval: 138  QRS Interval: 90  QTc Interval: 428  ST Changes: No St changes.  Comparison: No prior available.     I have independently reviewed and interpreted the EKG(s) documented above.        I, Bryan Kay, am serving as a scribe to document services personally performed by Lexie Comer MD, based on my observation and the provider's statements to me. I, Lexie Comer MD, attest that Bryan Kay is acting in a scribe capacity, has observed my performance of the services and has documented them in accordance with my direction.    Lexie Comer MD  Emergency Medicine  [facility]  [facility] EMERGENCY DEPARTMENT  [address] [place] [id number]  [phone]      Lexie Comer MD  02/11/25 [id number]

## 2025-02-12 NOTE — ED TRIAGE NOTES
"      Patient states she has been \"heavy headed\" describes it as room spinning as well and these episodes have come and gone today she also reports some across the chest burning earlier today. Patient presented to urgent care who performed EKG and recommended patient present to ED.   "

## 2025-02-12 NOTE — ED PROVIDER NOTES
"eMERGENCY dEPARTMENT PROGRESS NOTE      Patient was signed out to me by Lexie Comer MD at 10:15 PM.      HPI  Rossana Rice is a 74 year old female with a pertinent history of esophageal reflux, hypertension, hypercholesterolemia, osteoporosis, CT coronary done last year with a score of 0, no cardiac history, who presents to this ED for evaluation of chest pressure.     For the past 2 months initially started as congestion and sinus drainage, she has endorsed intermittent dizziness episodes. She has seen her primary care provider for these episodes. She has attempted dizziness maneuvers learned online which seemed to improve symptoms. Since an onset earlier today (02/11/2025) at 9:30 AM (~11 hours ago) while sitting, she started to endorse a \"hot pressure\" like sensation across her chest. She did not feel shortness of breath or dizziness. This sensation occurred on and off all day. She denies fever, chills, sweats, or any other symptoms at this time.    LABS  Pertinent lab results reviewed in chart.  Results for orders placed or performed during the hospital encounter of 02/11/25   Chest XR,  PA & LAT    Impression    IMPRESSION: Negative chest.   CBC (+ platelets, no diff)   Result Value Ref Range    WBC Count 11.8 (H) 4.0 - 11.0 10e3/uL    RBC Count 4.36 3.80 - 5.20 10e6/uL    Hemoglobin 13.0 11.7 - 15.7 g/dL    Hematocrit 40.1 35.0 - 47.0 %    MCV 92 78 - 100 fL    MCH 29.8 26.5 - 33.0 pg    MCHC 32.4 31.5 - 36.5 g/dL    RDW 13.5 10.0 - 15.0 %    Platelet Count 627 (H) 150 - 450 10e3/uL   Result Value Ref Range    Troponin T, High Sensitivity 9 <=14 ng/L   Comprehensive metabolic panel   Result Value Ref Range    Sodium 141 135 - 145 mmol/L    Potassium 4.4 3.4 - 5.3 mmol/L    Carbon Dioxide (CO2) 26 22 - 29 mmol/L    Anion Gap 10 7 - 15 mmol/L    Urea Nitrogen 15.0 8.0 - 23.0 mg/dL    Creatinine 0.73 0.51 - 0.95 mg/dL    GFR Estimate 86 >60 mL/min/1.73m2    Calcium 10.5 (H) 8.8 - 10.4 mg/dL    Chloride " 105 98 - 107 mmol/L    Glucose 113 (H) 70 - 99 mg/dL    Alkaline Phosphatase 100 40 - 150 U/L    AST 26 0 - 45 U/L    ALT 24 0 - 50 U/L    Protein Total 7.7 6.4 - 8.3 g/dL    Albumin 4.7 3.5 - 5.2 g/dL    Bilirubin Total 0.3 <=1.2 mg/dL   Result Value Ref Range    Troponin T, High Sensitivity 10 <=14 ng/L       EKG  Performed at: 11-Feb-2025 19:46:51     Impression: Normal sinus rhythm. Normal ECG.     Rate: 74  Rhythm: Normal sinus rhythm.     GA Interval: 138  QRS Interval: 90  QTc Interval: 428  ST Changes: No St changes.  Comparison: No prior available.      I have independently reviewed and interpreted the EKG(s) documented above.    RADIOLOGY    PROCEDURES     ED COURSE & MEDICAL DECISION MAKING    Pertinent Labs and Imagaing reviewed (see chart for details)    At the conclusion of the encounter I discussed  the results of all of the tests and the disposition.   The questions were answered.  The patient or family acknowledged understanding and was agreeable with the care plan.     11:24 PM patient repeat troponin came back negative.  I discussed with patient likelihood of this being GERD with the burning feeling and negative troponins.  Patient reassured however would recommend rapid access follow-up for chest pain clinic and reassessment.  Patient feeling better with no symptoms will be plan for discharge home.    FINAL IMPRESSION        1. Sinusitis, unspecified chronicity, unspecified location    2. Dizziness    3. Chest pain, unspecified type          CRITICAL CARE        I, Anoop Geller, am serving as a scribe to document services personally performed by Fish Frausto, based on my observations and the provider's statements to me.  I, Fish Frausto, attest that Anoop Geller is acting in a scribe capacity, has observed my performance of the services and has documented them in accordance with my direction.       Fish Frausto MD  02/11/25 2231

## 2025-02-20 LAB
ATRIAL RATE - MUSE: 74 BPM
DIASTOLIC BLOOD PRESSURE - MUSE: NORMAL MMHG
INTERPRETATION ECG - MUSE: NORMAL
P AXIS - MUSE: 80 DEGREES
PR INTERVAL - MUSE: 138 MS
QRS DURATION - MUSE: 90 MS
QT - MUSE: 386 MS
QTC - MUSE: 428 MS
R AXIS - MUSE: 81 DEGREES
SYSTOLIC BLOOD PRESSURE - MUSE: NORMAL MMHG
T AXIS - MUSE: 73 DEGREES
VENTRICULAR RATE- MUSE: 74 BPM